# Patient Record
Sex: MALE | Race: WHITE | NOT HISPANIC OR LATINO
[De-identification: names, ages, dates, MRNs, and addresses within clinical notes are randomized per-mention and may not be internally consistent; named-entity substitution may affect disease eponyms.]

---

## 2018-10-04 ENCOUNTER — APPOINTMENT (OUTPATIENT)
Dept: SURGERY | Facility: CLINIC | Age: 68
End: 2018-10-04
Payer: MEDICARE

## 2018-10-04 ENCOUNTER — LABORATORY RESULT (OUTPATIENT)
Age: 68
End: 2018-10-04

## 2018-10-04 VITALS
HEART RATE: 61 BPM | WEIGHT: 215 LBS | SYSTOLIC BLOOD PRESSURE: 174 MMHG | HEIGHT: 69 IN | BODY MASS INDEX: 31.84 KG/M2 | DIASTOLIC BLOOD PRESSURE: 82 MMHG

## 2018-10-04 DIAGNOSIS — K11.9 DISEASE OF SALIVARY GLAND, UNSPECIFIED: ICD-10-CM

## 2018-10-04 PROCEDURE — 10021 FNA BX W/O IMG GDN 1ST LES: CPT

## 2018-10-04 PROCEDURE — 99203 OFFICE O/P NEW LOW 30 MIN: CPT

## 2018-10-04 RX ORDER — TAMSULOSIN HYDROCHLORIDE 0.4 MG/1
0.4 CAPSULE ORAL
Qty: 90 | Refills: 0 | Status: ACTIVE | COMMUNITY
Start: 2018-07-24

## 2018-10-04 RX ORDER — CLARITHROMYCIN 500 MG/1
500 TABLET, FILM COATED ORAL
Qty: 14 | Refills: 0 | Status: COMPLETED | COMMUNITY
Start: 2018-08-20

## 2018-10-04 RX ORDER — COLESEVELAM HYDROCHLORIDE 625 MG/1
625 TABLET, COATED ORAL
Qty: 60 | Refills: 0 | Status: COMPLETED | COMMUNITY
Start: 2018-08-13

## 2018-10-04 RX ORDER — VALSARTAN AND HYDROCHLOROTHIAZIDE 160; 25 MG/1; MG/1
160-25 TABLET, FILM COATED ORAL
Qty: 90 | Refills: 0 | Status: ACTIVE | COMMUNITY
Start: 2018-07-06

## 2018-10-10 ENCOUNTER — OTHER (OUTPATIENT)
Age: 68
End: 2018-10-10

## 2022-03-21 ENCOUNTER — APPOINTMENT (OUTPATIENT)
Dept: UROLOGY | Facility: CLINIC | Age: 72
End: 2022-03-21

## 2022-05-17 ENCOUNTER — APPOINTMENT (OUTPATIENT)
Dept: COLORECTAL SURGERY | Facility: CLINIC | Age: 72
End: 2022-05-17
Payer: MEDICARE

## 2022-05-17 VITALS
HEART RATE: 60 BPM | RESPIRATION RATE: 14 BRPM | HEIGHT: 70 IN | SYSTOLIC BLOOD PRESSURE: 154 MMHG | WEIGHT: 190 LBS | OXYGEN SATURATION: 99 % | BODY MASS INDEX: 27.2 KG/M2 | TEMPERATURE: 97.52 F | DIASTOLIC BLOOD PRESSURE: 71 MMHG

## 2022-05-17 DIAGNOSIS — Z87.891 PERSONAL HISTORY OF NICOTINE DEPENDENCE: ICD-10-CM

## 2022-05-17 DIAGNOSIS — K64.9 UNSPECIFIED HEMORRHOIDS: ICD-10-CM

## 2022-05-17 DIAGNOSIS — Z78.9 OTHER SPECIFIED HEALTH STATUS: ICD-10-CM

## 2022-05-17 DIAGNOSIS — K60.3 ANAL FISTULA: ICD-10-CM

## 2022-05-17 DIAGNOSIS — Z00.00 ENCOUNTER FOR GENERAL ADULT MEDICAL EXAMINATION W/OUT ABNORMAL FINDINGS: ICD-10-CM

## 2022-05-17 DIAGNOSIS — K62.5 HEMORRHAGE OF ANUS AND RECTUM: ICD-10-CM

## 2022-05-17 PROCEDURE — 99203 OFFICE O/P NEW LOW 30 MIN: CPT | Mod: 25

## 2022-05-17 PROCEDURE — 46600 DIAGNOSTIC ANOSCOPY SPX: CPT

## 2022-05-17 RX ORDER — OMEPRAZOLE 40 MG/1
40 CAPSULE, DELAYED RELEASE ORAL
Refills: 0 | Status: ACTIVE | COMMUNITY

## 2022-05-17 RX ORDER — ROSUVASTATIN CALCIUM 10 MG/1
10 TABLET, FILM COATED ORAL
Refills: 0 | Status: ACTIVE | COMMUNITY

## 2022-05-17 RX ORDER — CHOLECALCIFEROL (VITAMIN D3) 25 MCG
TABLET ORAL
Refills: 0 | Status: ACTIVE | COMMUNITY

## 2022-05-17 RX ORDER — APIXABAN 5 MG/1
5 TABLET, FILM COATED ORAL
Refills: 0 | Status: ACTIVE | COMMUNITY

## 2022-05-17 RX ORDER — METHYLPREDNISOLONE 4 MG/1
4 TABLET ORAL
Qty: 21 | Refills: 0 | Status: DISCONTINUED | COMMUNITY
Start: 2018-08-21 | End: 2022-05-17

## 2022-05-17 RX ORDER — FENOFIBRATE 145 MG/1
145 TABLET, COATED ORAL
Qty: 30 | Refills: 0 | Status: DISCONTINUED | COMMUNITY
Start: 2018-09-27 | End: 2022-05-17

## 2022-05-17 RX ORDER — MULTIVIT-MIN/FOLIC/VIT K/LYCOP 400-300MCG
TABLET ORAL
Refills: 0 | Status: ACTIVE | COMMUNITY

## 2022-05-17 RX ORDER — PANTOPRAZOLE 40 MG/1
40 TABLET, DELAYED RELEASE ORAL
Qty: 90 | Refills: 0 | Status: DISCONTINUED | COMMUNITY
Start: 2018-07-24 | End: 2022-05-17

## 2022-05-17 NOTE — ASSESSMENT
[FreeTextEntry1] : Likely hemorrhoidal bleeding\par -Patient with intermittent blood per rectum worsened with anticoagulation. Recent colonoscopy was otherwise normal\par -Hemorrhoids appear to be moderate in appearance but some irritation is appreciated\par -I recommended initial conservative therapy given interventions are limited with anti-coagulation and the patient's hemorrhoids are moderate grade 1/2 in appearance\par -Patient to initiate steroid cream internally 2 times per day for 2 weeks. He will then use as needed\par -Fiber supplementation daily\par -Followup in 4 weeks if no improvement we could consider operative ligation at that time\par -All questions answered

## 2022-05-17 NOTE — HISTORY OF PRESENT ILLNESS
[FreeTextEntry1] : 72yo M pt presents with rectal bleeding when pt has BM for about 6 months, occasional straining BM every other time he goes. \par Says now that pt is on eliquis 5mg for Afib starting May 1, so is concerned about more pronounced bleeding.\par Pt has BM daily, hard stools.Occasional straining\par Denies anal pain, abd pain, nausea, vomiting, prolapsing tissue.\par \par Pt was here seen by Dr. Migdalia Alston in [id number] for fistulotomy and RBL for IH.\par Last colonoscopy 2021, nl study. , No family history of colon cancer or inflammatory bowel disease

## 2022-05-17 NOTE — PHYSICAL EXAM
[Normal Breath Sounds] : Normal breath sounds [Normal Heart Sounds] : normal heart sounds [Normal Rate and Rhythm] : normal rate and rhythm [Alert] : alert [Oriented to Person] : oriented to person [Oriented to Place] : oriented to place [Oriented to Time] : oriented to time [Calm] : calm [de-identified] : round soft +BS NT/ND [de-identified] : well nourished male [de-identified] : NC/AT [de-identified] : +ROM [de-identified] : intact

## 2022-05-17 NOTE — CONSULT LETTER
[Dear  ___] : Dear  [unfilled], [Consult Letter:] : I had the pleasure of evaluating your patient, [unfilled]. [Please see my note below.] : Please see my note below. [Consult Closing:] : Thank you very much for allowing me to participate in the care of this patient.  If you have any questions, please do not hesitate to contact me. [Sincerely,] : Sincerely, [FreeTextEntry2] : Jamarcus Lindsay [FreeTextEntry3] : John Gabriel MD FACS\par Chief Colon and Rectal Surgery\par [facility]

## 2022-06-03 ENCOUNTER — INPATIENT (INPATIENT)
Facility: HOSPITAL | Age: 72
LOS: 3 days | Discharge: HOME | DRG: 438 | End: 2022-06-07
Attending: HOSPITALIST | Admitting: STUDENT IN AN ORGANIZED HEALTH CARE EDUCATION/TRAINING PROGRAM
Payer: MEDICARE

## 2022-06-03 VITALS
SYSTOLIC BLOOD PRESSURE: 133 MMHG | HEIGHT: 70 IN | DIASTOLIC BLOOD PRESSURE: 66 MMHG | OXYGEN SATURATION: 99 % | WEIGHT: 184.97 LBS | HEART RATE: 73 BPM | RESPIRATION RATE: 18 BRPM | TEMPERATURE: 98 F

## 2022-06-03 DIAGNOSIS — Z98.89 OTHER SPECIFIED POSTPROCEDURAL STATES: Chronic | ICD-10-CM

## 2022-06-03 DIAGNOSIS — K80.20 CALCULUS OF GALLBLADDER WITHOUT CHOLECYSTITIS WITHOUT OBSTRUCTION: Chronic | ICD-10-CM

## 2022-06-03 PROCEDURE — 99285 EMERGENCY DEPT VISIT HI MDM: CPT

## 2022-06-04 DIAGNOSIS — K21.9 GASTRO-ESOPHAGEAL REFLUX DISEASE WITHOUT ESOPHAGITIS: ICD-10-CM

## 2022-06-04 DIAGNOSIS — I10 ESSENTIAL (PRIMARY) HYPERTENSION: ICD-10-CM

## 2022-06-04 DIAGNOSIS — Z90.49 ACQUIRED ABSENCE OF OTHER SPECIFIED PARTS OF DIGESTIVE TRACT: Chronic | ICD-10-CM

## 2022-06-04 DIAGNOSIS — I48.20 CHRONIC ATRIAL FIBRILLATION, UNSPECIFIED: ICD-10-CM

## 2022-06-04 DIAGNOSIS — K22.70 BARRETT'S ESOPHAGUS WITHOUT DYSPLASIA: ICD-10-CM

## 2022-06-04 DIAGNOSIS — Z00.00 ENCOUNTER FOR GENERAL ADULT MEDICAL EXAMINATION WITHOUT ABNORMAL FINDINGS: ICD-10-CM

## 2022-06-04 DIAGNOSIS — K85.90 ACUTE PANCREATITIS WITHOUT NECROSIS OR INFECTION, UNSPECIFIED: ICD-10-CM

## 2022-06-04 DIAGNOSIS — Z87.448 PERSONAL HISTORY OF OTHER DISEASES OF URINARY SYSTEM: ICD-10-CM

## 2022-06-04 DIAGNOSIS — K86.89 OTHER SPECIFIED DISEASES OF PANCREAS: ICD-10-CM

## 2022-06-04 DIAGNOSIS — I48.0 PAROXYSMAL ATRIAL FIBRILLATION: ICD-10-CM

## 2022-06-04 DIAGNOSIS — E87.1 HYPO-OSMOLALITY AND HYPONATREMIA: ICD-10-CM

## 2022-06-04 LAB
ALBUMIN SERPL ELPH-MCNC: 3.9 G/DL — SIGNIFICANT CHANGE UP (ref 3.3–5)
ALP SERPL-CCNC: 104 U/L — SIGNIFICANT CHANGE UP (ref 40–120)
ALT FLD-CCNC: 10 U/L — SIGNIFICANT CHANGE UP (ref 10–45)
ANION GAP SERPL CALC-SCNC: 17 MMOL/L — SIGNIFICANT CHANGE UP (ref 5–17)
APPEARANCE UR: CLEAR — SIGNIFICANT CHANGE UP
APTT BLD: 40.7 SEC — HIGH (ref 27.5–35.5)
AST SERPL-CCNC: 13 U/L — SIGNIFICANT CHANGE UP (ref 10–40)
BACTERIA # UR AUTO: NEGATIVE — SIGNIFICANT CHANGE UP
BASE EXCESS BLDV CALC-SCNC: 2.7 MMOL/L — HIGH (ref -2–2)
BASOPHILS # BLD AUTO: 0 K/UL — SIGNIFICANT CHANGE UP (ref 0–0.2)
BASOPHILS NFR BLD AUTO: 0 % — SIGNIFICANT CHANGE UP (ref 0–2)
BILIRUB SERPL-MCNC: 0.9 MG/DL — SIGNIFICANT CHANGE UP (ref 0.2–1.2)
BILIRUB UR-MCNC: NEGATIVE — SIGNIFICANT CHANGE UP
BLD GP AB SCN SERPL QL: NEGATIVE — SIGNIFICANT CHANGE UP
BLOOD GAS VENOUS - CREATININE: SIGNIFICANT CHANGE UP MG/DL (ref 0.5–1.3)
BUN SERPL-MCNC: 16 MG/DL — SIGNIFICANT CHANGE UP (ref 7–23)
BUN SERPL-MCNC: 20 MG/DL — SIGNIFICANT CHANGE UP (ref 7–23)
BUN SERPL-MCNC: 22 MG/DL — SIGNIFICANT CHANGE UP (ref 7–23)
CA-I SERPL-SCNC: 1.23 MMOL/L — SIGNIFICANT CHANGE UP (ref 1.15–1.33)
CALCIUM SERPL-MCNC: 10 MG/DL — SIGNIFICANT CHANGE UP (ref 8.4–10.5)
CALCIUM SERPL-MCNC: 10 MG/DL — SIGNIFICANT CHANGE UP (ref 8.4–10.5)
CALCIUM SERPL-MCNC: 9.7 MG/DL — SIGNIFICANT CHANGE UP (ref 8.4–10.5)
CHLORIDE BLDV-SCNC: 88 MMOL/L — LOW (ref 96–108)
CHLORIDE SERPL-SCNC: 79 MMOL/L — LOW (ref 96–108)
CHLORIDE SERPL-SCNC: 80 MMOL/L — LOW (ref 96–108)
CHLORIDE SERPL-SCNC: 85 MMOL/L — LOW (ref 96–108)
CO2 BLDV-SCNC: 27 MMOL/L — HIGH (ref 22–26)
CO2 SERPL-SCNC: 22 MMOL/L — SIGNIFICANT CHANGE UP (ref 22–31)
CO2 SERPL-SCNC: 24 MMOL/L — SIGNIFICANT CHANGE UP (ref 22–31)
CO2 SERPL-SCNC: 24 MMOL/L — SIGNIFICANT CHANGE UP (ref 22–31)
COLOR SPEC: SIGNIFICANT CHANGE UP
CREAT ?TM UR-MCNC: 58 MG/DL — SIGNIFICANT CHANGE UP
CREAT SERPL-MCNC: 1.01 MG/DL — SIGNIFICANT CHANGE UP (ref 0.5–1.3)
CREAT SERPL-MCNC: 1.19 MG/DL — SIGNIFICANT CHANGE UP (ref 0.5–1.3)
CREAT SERPL-MCNC: 1.26 MG/DL — SIGNIFICANT CHANGE UP (ref 0.5–1.3)
DIFF PNL FLD: ABNORMAL
EGFR: 61 ML/MIN/1.73M2 — SIGNIFICANT CHANGE UP
EGFR: 65 ML/MIN/1.73M2 — SIGNIFICANT CHANGE UP
EGFR: 79 ML/MIN/1.73M2 — SIGNIFICANT CHANGE UP
EOSINOPHIL # BLD AUTO: 0 K/UL — SIGNIFICANT CHANGE UP (ref 0–0.5)
EOSINOPHIL NFR BLD AUTO: 0 % — SIGNIFICANT CHANGE UP (ref 0–6)
EPI CELLS # UR: 0 /HPF — SIGNIFICANT CHANGE UP
GAS PNL BLDV: 119 MMOL/L — CRITICAL LOW (ref 136–145)
GAS PNL BLDV: SIGNIFICANT CHANGE UP
GAS PNL BLDV: SIGNIFICANT CHANGE UP
GLUCOSE BLDV-MCNC: 99 MG/DL — SIGNIFICANT CHANGE UP (ref 70–99)
GLUCOSE SERPL-MCNC: 100 MG/DL — HIGH (ref 70–99)
GLUCOSE SERPL-MCNC: 102 MG/DL — HIGH (ref 70–99)
GLUCOSE SERPL-MCNC: 93 MG/DL — SIGNIFICANT CHANGE UP (ref 70–99)
GLUCOSE UR QL: NEGATIVE — SIGNIFICANT CHANGE UP
HCO3 BLDV-SCNC: 26 MMOL/L — SIGNIFICANT CHANGE UP (ref 22–29)
HCT VFR BLD CALC: 30.3 % — LOW (ref 39–50)
HCT VFR BLDA CALC: 32 % — LOW (ref 39–51)
HGB BLD CALC-MCNC: 10.8 G/DL — LOW (ref 12.6–17.4)
HGB BLD-MCNC: 10.7 G/DL — LOW (ref 13–17)
INR BLD: 2.71 RATIO — HIGH (ref 0.88–1.16)
KETONES UR-MCNC: ABNORMAL
LACTATE BLDV-MCNC: 0.9 MMOL/L — SIGNIFICANT CHANGE UP (ref 0.7–2)
LEUKOCYTE ESTERASE UR-ACNC: NEGATIVE — SIGNIFICANT CHANGE UP
LIDOCAIN IGE QN: 215 U/L — HIGH (ref 7–60)
LYMPHOCYTES # BLD AUTO: 0.3 K/UL — LOW (ref 1–3.3)
LYMPHOCYTES # BLD AUTO: 1.7 % — LOW (ref 13–44)
MAGNESIUM SERPL-MCNC: 1.1 MG/DL — LOW (ref 1.6–2.6)
MANUAL SMEAR VERIFICATION: SIGNIFICANT CHANGE UP
MCHC RBC-ENTMCNC: 28.5 PG — SIGNIFICANT CHANGE UP (ref 27–34)
MCHC RBC-ENTMCNC: 35.3 GM/DL — SIGNIFICANT CHANGE UP (ref 32–36)
MCV RBC AUTO: 80.6 FL — SIGNIFICANT CHANGE UP (ref 80–100)
MONOCYTES # BLD AUTO: 0.46 K/UL — SIGNIFICANT CHANGE UP (ref 0–0.9)
MONOCYTES NFR BLD AUTO: 2.6 % — SIGNIFICANT CHANGE UP (ref 2–14)
NEUTROPHILS # BLD AUTO: 17.01 K/UL — HIGH (ref 1.8–7.4)
NEUTROPHILS NFR BLD AUTO: 94.8 % — HIGH (ref 43–77)
NEUTS BAND # BLD: 0.9 % — SIGNIFICANT CHANGE UP (ref 0–8)
NITRITE UR-MCNC: NEGATIVE — SIGNIFICANT CHANGE UP
OSMOLALITY UR: 275 MOS/KG — LOW (ref 300–900)
OTHER CELLS CSF MANUAL: 12.2 ML/DL — LOW (ref 18–22)
PCO2 BLDV: 35 MMHG — LOW (ref 42–55)
PH BLDV: 7.48 — HIGH (ref 7.32–7.43)
PH UR: 6 — SIGNIFICANT CHANGE UP (ref 5–8)
PHOSPHATE SERPL-MCNC: 1.9 MG/DL — LOW (ref 2.5–4.5)
PLAT MORPH BLD: NORMAL — SIGNIFICANT CHANGE UP
PLATELET # BLD AUTO: 333 K/UL — SIGNIFICANT CHANGE UP (ref 150–400)
PO2 BLDV: 48 MMHG — HIGH (ref 25–45)
POTASSIUM BLDV-SCNC: 3.8 MMOL/L — SIGNIFICANT CHANGE UP (ref 3.5–5.1)
POTASSIUM SERPL-MCNC: 3.1 MMOL/L — LOW (ref 3.5–5.3)
POTASSIUM SERPL-MCNC: 3.4 MMOL/L — LOW (ref 3.5–5.3)
POTASSIUM SERPL-MCNC: 3.8 MMOL/L — SIGNIFICANT CHANGE UP (ref 3.5–5.3)
POTASSIUM SERPL-SCNC: 3.1 MMOL/L — LOW (ref 3.5–5.3)
POTASSIUM SERPL-SCNC: 3.4 MMOL/L — LOW (ref 3.5–5.3)
POTASSIUM SERPL-SCNC: 3.8 MMOL/L — SIGNIFICANT CHANGE UP (ref 3.5–5.3)
PROT ?TM UR-MCNC: 23 MG/DL — HIGH (ref 0–12)
PROT SERPL-MCNC: 7.2 G/DL — SIGNIFICANT CHANGE UP (ref 6–8.3)
PROT UR-MCNC: ABNORMAL
PROTHROM AB SERPL-ACNC: 31.7 SEC — HIGH (ref 10.5–13.4)
RAPID RVP RESULT: SIGNIFICANT CHANGE UP
RBC # BLD: 3.76 M/UL — LOW (ref 4.2–5.8)
RBC # FLD: 12.9 % — SIGNIFICANT CHANGE UP (ref 10.3–14.5)
RBC BLD AUTO: SIGNIFICANT CHANGE UP
RBC CASTS # UR COMP ASSIST: 1 /HPF — SIGNIFICANT CHANGE UP (ref 0–4)
RH IG SCN BLD-IMP: NEGATIVE — SIGNIFICANT CHANGE UP
RH IG SCN BLD-IMP: NEGATIVE — SIGNIFICANT CHANGE UP
SAO2 % BLDV: 82.7 % — SIGNIFICANT CHANGE UP (ref 67–88)
SARS-COV-2 RNA SPEC QL NAA+PROBE: SIGNIFICANT CHANGE UP
SODIUM SERPL-SCNC: 120 MMOL/L — CRITICAL LOW (ref 135–145)
SODIUM SERPL-SCNC: 121 MMOL/L — LOW (ref 135–145)
SODIUM SERPL-SCNC: 124 MMOL/L — LOW (ref 135–145)
SODIUM UR-SCNC: 16 MMOL/L — SIGNIFICANT CHANGE UP
SP GR SPEC: 1.03 — HIGH (ref 1.01–1.02)
UROBILINOGEN FLD QL: NEGATIVE — SIGNIFICANT CHANGE UP
WBC # BLD: 17.77 K/UL — HIGH (ref 3.8–10.5)
WBC # FLD AUTO: 17.77 K/UL — HIGH (ref 3.8–10.5)
WBC UR QL: 1 /HPF — SIGNIFICANT CHANGE UP (ref 0–5)

## 2022-06-04 PROCEDURE — 99223 1ST HOSP IP/OBS HIGH 75: CPT | Mod: GC,AI

## 2022-06-04 PROCEDURE — 71045 X-RAY EXAM CHEST 1 VIEW: CPT | Mod: 26

## 2022-06-04 PROCEDURE — 74177 CT ABD & PELVIS W/CONTRAST: CPT | Mod: 26,MA

## 2022-06-04 PROCEDURE — 99222 1ST HOSP IP/OBS MODERATE 55: CPT

## 2022-06-04 RX ORDER — SODIUM CHLORIDE 9 MG/ML
1000 INJECTION, SOLUTION INTRAVENOUS
Refills: 0 | Status: DISCONTINUED | OUTPATIENT
Start: 2022-06-04 | End: 2022-06-05

## 2022-06-04 RX ORDER — PANTOPRAZOLE SODIUM 20 MG/1
40 TABLET, DELAYED RELEASE ORAL EVERY 12 HOURS
Refills: 0 | Status: DISCONTINUED | OUTPATIENT
Start: 2022-06-04 | End: 2022-06-05

## 2022-06-04 RX ORDER — ALLOPURINOL 300 MG
200 TABLET ORAL DAILY
Refills: 0 | Status: DISCONTINUED | OUTPATIENT
Start: 2022-06-04 | End: 2022-06-04

## 2022-06-04 RX ORDER — BISOPROLOL FUMARATE 10 MG/1
10 TABLET, FILM COATED ORAL DAILY
Refills: 0 | Status: DISCONTINUED | OUTPATIENT
Start: 2022-06-04 | End: 2022-06-07

## 2022-06-04 RX ORDER — MAGNESIUM SULFATE 500 MG/ML
2 VIAL (ML) INJECTION ONCE
Refills: 0 | Status: COMPLETED | OUTPATIENT
Start: 2022-06-04 | End: 2022-06-04

## 2022-06-04 RX ORDER — POTASSIUM PHOSPHATE, MONOBASIC POTASSIUM PHOSPHATE, DIBASIC 236; 224 MG/ML; MG/ML
30 INJECTION, SOLUTION INTRAVENOUS ONCE
Refills: 0 | Status: COMPLETED | OUTPATIENT
Start: 2022-06-04 | End: 2022-06-04

## 2022-06-04 RX ORDER — ACETAMINOPHEN 500 MG
650 TABLET ORAL EVERY 6 HOURS
Refills: 0 | Status: DISCONTINUED | OUTPATIENT
Start: 2022-06-04 | End: 2022-06-07

## 2022-06-04 RX ORDER — ALLOPURINOL 300 MG
200 TABLET ORAL
Qty: 0 | Refills: 0 | DISCHARGE

## 2022-06-04 RX ORDER — ROSUVASTATIN CALCIUM 5 MG/1
1 TABLET ORAL
Qty: 0 | Refills: 0 | DISCHARGE

## 2022-06-04 RX ORDER — POTASSIUM CHLORIDE 20 MEQ
40 PACKET (EA) ORAL ONCE
Refills: 0 | Status: COMPLETED | OUTPATIENT
Start: 2022-06-04 | End: 2022-06-04

## 2022-06-04 RX ORDER — PANTOPRAZOLE SODIUM 20 MG/1
80 TABLET, DELAYED RELEASE ORAL ONCE
Refills: 0 | Status: COMPLETED | OUTPATIENT
Start: 2022-06-04 | End: 2022-06-04

## 2022-06-04 RX ORDER — ONDANSETRON 8 MG/1
4 TABLET, FILM COATED ORAL EVERY 8 HOURS
Refills: 0 | Status: DISCONTINUED | OUTPATIENT
Start: 2022-06-04 | End: 2022-06-07

## 2022-06-04 RX ORDER — SODIUM CHLORIDE 9 MG/ML
500 INJECTION, SOLUTION INTRAVENOUS ONCE
Refills: 0 | Status: COMPLETED | OUTPATIENT
Start: 2022-06-04 | End: 2022-06-04

## 2022-06-04 RX ORDER — MEROPENEM 1 G/30ML
1000 INJECTION INTRAVENOUS EVERY 8 HOURS
Refills: 0 | Status: DISCONTINUED | OUTPATIENT
Start: 2022-06-04 | End: 2022-06-06

## 2022-06-04 RX ORDER — LANOLIN ALCOHOL/MO/W.PET/CERES
3 CREAM (GRAM) TOPICAL AT BEDTIME
Refills: 0 | Status: DISCONTINUED | OUTPATIENT
Start: 2022-06-04 | End: 2022-06-07

## 2022-06-04 RX ORDER — ALLOPURINOL 300 MG
200 TABLET ORAL DAILY
Refills: 0 | Status: DISCONTINUED | OUTPATIENT
Start: 2022-06-04 | End: 2022-06-07

## 2022-06-04 RX ORDER — POTASSIUM CHLORIDE 20 MEQ
10 PACKET (EA) ORAL
Refills: 0 | Status: COMPLETED | OUTPATIENT
Start: 2022-06-04 | End: 2022-06-04

## 2022-06-04 RX ORDER — HYDROCORTISONE 1 %
1 OINTMENT (GRAM) TOPICAL THREE TIMES A DAY
Refills: 0 | Status: DISCONTINUED | OUTPATIENT
Start: 2022-06-04 | End: 2022-06-07

## 2022-06-04 RX ORDER — ROSUVASTATIN CALCIUM 5 MG/1
10 TABLET ORAL AT BEDTIME
Refills: 0 | Status: DISCONTINUED | OUTPATIENT
Start: 2022-06-04 | End: 2022-06-07

## 2022-06-04 RX ORDER — TAMSULOSIN HYDROCHLORIDE 0.4 MG/1
0.8 CAPSULE ORAL
Qty: 0 | Refills: 0 | DISCHARGE

## 2022-06-04 RX ORDER — TAMSULOSIN HYDROCHLORIDE 0.4 MG/1
0.8 CAPSULE ORAL AT BEDTIME
Refills: 0 | Status: DISCONTINUED | OUTPATIENT
Start: 2022-06-04 | End: 2022-06-07

## 2022-06-04 RX ORDER — HYDROCORTISONE 1 %
1 OINTMENT (GRAM) TOPICAL
Qty: 0 | Refills: 0 | DISCHARGE

## 2022-06-04 RX ORDER — CALCIUM CARBONATE 500(1250)
2 TABLET ORAL
Qty: 0 | Refills: 0 | DISCHARGE

## 2022-06-04 RX ORDER — TAMSULOSIN HYDROCHLORIDE 0.4 MG/1
1 CAPSULE ORAL
Qty: 0 | Refills: 0 | DISCHARGE

## 2022-06-04 RX ADMIN — ROSUVASTATIN CALCIUM 10 MILLIGRAM(S): 5 TABLET ORAL at 22:37

## 2022-06-04 RX ADMIN — Medication 100 MILLIEQUIVALENT(S): at 10:08

## 2022-06-04 RX ADMIN — Medication 25 GRAM(S): at 20:45

## 2022-06-04 RX ADMIN — Medication 100 MILLIEQUIVALENT(S): at 08:44

## 2022-06-04 RX ADMIN — BISOPROLOL FUMARATE 10 MILLIGRAM(S): 10 TABLET, FILM COATED ORAL at 15:53

## 2022-06-04 RX ADMIN — Medication 40 MILLIEQUIVALENT(S): at 08:53

## 2022-06-04 RX ADMIN — POTASSIUM PHOSPHATE, MONOBASIC POTASSIUM PHOSPHATE, DIBASIC 83.33 MILLIMOLE(S): 236; 224 INJECTION, SOLUTION INTRAVENOUS at 22:36

## 2022-06-04 RX ADMIN — PANTOPRAZOLE SODIUM 80 MILLIGRAM(S): 20 TABLET, DELAYED RELEASE ORAL at 05:19

## 2022-06-04 RX ADMIN — SODIUM CHLORIDE 500 MILLILITER(S): 9 INJECTION, SOLUTION INTRAVENOUS at 05:25

## 2022-06-04 RX ADMIN — TAMSULOSIN HYDROCHLORIDE 0.8 MILLIGRAM(S): 0.4 CAPSULE ORAL at 22:36

## 2022-06-04 RX ADMIN — SODIUM CHLORIDE 100 MILLILITER(S): 9 INJECTION, SOLUTION INTRAVENOUS at 11:57

## 2022-06-04 RX ADMIN — MEROPENEM 100 MILLIGRAM(S): 1 INJECTION INTRAVENOUS at 16:30

## 2022-06-04 RX ADMIN — SODIUM CHLORIDE 500 MILLILITER(S): 9 INJECTION, SOLUTION INTRAVENOUS at 08:34

## 2022-06-04 RX ADMIN — Medication 100 MILLIEQUIVALENT(S): at 07:33

## 2022-06-04 RX ADMIN — PANTOPRAZOLE SODIUM 40 MILLIGRAM(S): 20 TABLET, DELAYED RELEASE ORAL at 19:01

## 2022-06-04 RX ADMIN — MEROPENEM 100 MILLIGRAM(S): 1 INJECTION INTRAVENOUS at 22:36

## 2022-06-04 NOTE — H&P ADULT - PROBLEM SELECTOR PLAN 6
DVT Prophylaxis: plan to restart home Eliquis 5mg daily  Diet: NPO, pending GI reccs  Increase activity as tolerated

## 2022-06-04 NOTE — ED ADULT NURSE NOTE - NSIMPLEMENTINTERV_GEN_ALL_ED
Implemented All Fall with Harm Risk Interventions:  [place] to call system. Call bell, personal items and telephone within reach. Instruct patient to call for assistance. Room bathroom lighting operational. Non-slip footwear when patient is off stretcher. Physically safe environment: no spills, clutter or unnecessary equipment. Stretcher in lowest position, wheels locked, appropriate side rails in place. Provide visual cue, wrist band, yellow gown, etc. Monitor gait and stability. Monitor for mental status changes and reorient to person, place, and time. Review medications for side effects contributing to fall risk. Reinforce activity limits and safety measures with patient and family. Provide visual clues: red socks.

## 2022-06-04 NOTE — H&P ADULT - NSICDXPASTSURGICALHX_GEN_ALL_CORE_FT
PAST SURGICAL HISTORY:  Gallbladder calculus     History of cholecystectomy     S/P herniorrhaphy     S/P tonsillectomy

## 2022-06-04 NOTE — H&P ADULT - PROBLEM SELECTOR PLAN 4
- home regimen: Bisoprolol 10mg/HCTZ 6.25mg home regimen: Bisoprolol 10mg/HCTZ 6.25mg  - hold HCTZ i/s/o hyponatremia  - c/w Bisoprolol 10mg daily

## 2022-06-04 NOTE — H&P ADULT - HISTORY OF PRESENT ILLNESS
70 y/o M w/ pmh of GERD, Marcial's Esophagus (Last EGD in 4/2022 w/ no acute changes), Gout, Afibb (on Eliquis), HTN who presents w/ abd pain. Sharp, up to 8/10 pains initially diffuse, now localized upper Lt quadrant/ mid- epigastrum, worsened after eating for 4 days. Associated w/ dark stools, watery  70 y/o M w/ pmh of GERD, Marcial's Esophagus (Last EGD in 4/2022 w/ no acute changes), Gout, Afibb (on Eliquis), HTN who presents w/ abd pain. Sharp, up to 8/10 pains initially diffuse, now localized upper Lt quadrant/ mid- epigastrum, worsened after eating for 4 days. 4 days ago patient noticed sharp abdominal pain within hour or two of dinner, felt like he had not had a bowel movement in a few days, and took two 5mg tablets Bisacoydl. 1hr later found with dark brown watery stools 3x, then later stools, more solid appearing though with bright red blood, noticed in toilet bowl. OF note, patient has hemorrhoids and during a flare will notice bright red blood on tissue paper only. PT had repeated episodic abdominal pain, sharp in lUQ/ mid epigastrium after meals. On 6/3 he had had chills, subjective fever of 101.2, took Tylenol and has been taking Tums for pain, went to urgent care, and was advised to come into the ED. With Esophageal history patient's last Colonoscopy was in 2021 with no known abnormal findings and last EGD in 4/2021 after which he was told to come back for repeat imaging in 6mos. Patient is a former smoker of 40 pack yrs, quit 20years ago, denies recent alcohol use or other recreational drug use. He has noticed some recent symptoms of heart burn, though pain was associated with his GERD, which usually doesn't flare, and was told to increase omeprazole to 20mg BID, which he began the new bid dosing on 6/3. PT denies nausea, vomiting, fevers/ chills at present. Ambulates independently at home.     ED:   Afebrile HD stable  Na 120--> 121 after 500cc LR bolus

## 2022-06-04 NOTE — H&P ADULT - NSHPREVIEWOFSYSTEMS_GEN_ALL_CORE
REVIEW OF SYSTEMS:    CONSTITUTIONAL: No weakness, fevers or chills  EYES/ENT: No visual changes;  No vertigo or throat pain   NECK: No pain or stiffness  RESPIRATORY: No cough, wheezing, hemoptysis; No shortness of breath  CARDIOVASCULAR: No chest pain or palpitations  GASTROINTESTINAL: + abdominal and epigastric pain, currently 1/10. No nausea, vomiting, or hematemesis; No diarrhea or constipation. No melena or hematochezia.  GENITOURINARY: No dysuria, frequency or hematuria  NEUROLOGICAL: No numbness or weakness  SKIN: No itching, rashes

## 2022-06-04 NOTE — ED PROVIDER NOTE - PHYSICAL EXAMINATION
General: well appearing   HEENT: neck supple, anicteric sclera  Cardiovascular: Normal s1, s2, RRR  Respiratory: CTA b/l   Abdominal: Soft, LLQ tenderness  Extremities: No swelling in LEs  Neurologic: Non focal  Psych: Awake, alert answering questions appropriately

## 2022-06-04 NOTE — H&P ADULT - NSHPPHYSICALEXAM_GEN_ALL_CORE
PHYSICAL EXAM:  GENERAL: overall well appearing, appearing stated age 70y/o Male in NAD, lying in bed comfortably  HEENT: +Rt eye severe lateral strabismus, NC/AT, EOMI, PERRL  NECK: Supple, No JVD  CHEST/LUNG: CTAB, no increased WOB  HEART: RRR, no m/r/g  ABDOMEN: soft, NT, ND, BS+  EXTREMITIES:  2+ peripheral pulses, no clubbing, no edema  NERVOUS SYSTEM:  A&Ox3, no focal deficits  MSK: FROM all 4 extremities, full and equal strength  SKIN: No rashes or lesions

## 2022-06-04 NOTE — H&P ADULT - NSHPLABSRESULTS_GEN_ALL_CORE
10.7   17.77 )-----------( 333      ( 2022 03:11 )             30.3       06-04    121<L>  |  80<L>  |  20  ----------------------------<  102<H>  3.1<L>   |  24  |  1.19    Ca    9.7      2022 06:32    TPro  7.2  /  Alb  3.9  /  TBili  0.9  /  DBili  x   /  AST  13  /  ALT  10  /  AlkPhos  104  06-04              Urinalysis Basic - ( 2022 05:40 )    Color: Light Yellow / Appearance: Clear / S.029 / pH: x  Gluc: x / Ketone: Small  / Bili: Negative / Urobili: Negative   Blood: x / Protein: 30 mg/dL / Nitrite: Negative   Leuk Esterase: Negative / RBC: 1 /hpf / WBC 1 /HPF   Sq Epi: x / Non Sq Epi: 0 /hpf / Bacteria: Negative        PT/INR - ( 2022 03:11 )   PT: 31.7 sec;   INR: 2.71 ratio         PTT - ( 2022 03:11 )  PTT:40.7 sec    Lactate Trend            CAPILLARY BLOOD GLUCOSE 10.7   17.77 )-----------( 333      ( 2022 03:11 )             30.3       06-04    121<L>  |  80<L>  |  20  ----------------------------<  102<H>  3.1<L>   |  24  |  1.19    Ca    9.7      2022 06:32    TPro  7.2  /  Alb  3.9  /  TBili  0.9  /  DBili  x   /  AST  13  /  ALT  10  /  AlkPhos  104  06-04              Urinalysis Basic - ( 2022 05:40 )    Color: Light Yellow / Appearance: Clear / S.029 / pH: x  Gluc: x / Ketone: Small  / Bili: Negative / Urobili: Negative   Blood: x / Protein: 30 mg/dL / Nitrite: Negative   Leuk Esterase: Negative / RBC: 1 /hpf / WBC 1 /HPF   Sq Epi: x / Non Sq Epi: 0 /hpf / Bacteria: Negative        PT/INR - ( 2022 03:11 )   PT: 31.7 sec;   INR: 2.71 ratio         PTT - ( 2022 03:11 )  PTT:40.7 sec    Lactate Trend    ACC: [id number] EXAM: CT ABDOMEN AND PELVIS IC    PROCEDURE DATE: 2022        INTERPRETATION: CLINICAL INFORMATION: Abdominal pain.    COMPARISON: None.    CONTRAST/COMPLICATIONS:  IV Contrast: Omnipaque 350 60 cc administered 0 cc discarded  Oral Contrast: NONE  Complications: None reported at time of study completion    PROCEDURE:  CT of the Abdomen and Pelvis was performed.  Sagittal and coronal reformats were performed.    FINDINGS:  LOWER CHEST: Within normal limits.    LIVER: Subcentimeter hypodensities too small to characterize  BILE DUCTS: Normal caliber.  GALLBLADDER: Cholecystectomy.  SPLEEN: Within normal limits.  PANCREAS: Edematous enlargement of the pancreatic body and tail with surrounding inflammatory changes and fluid. Several pancreatic and peripancreatic fluid collections. Representative collection in the pancreatic tail measures 4.5 x 3.2 x 3.8 cm, representative collection along the inferior aspect of the pancreatic body measures 4.0 x 4.1 x 2.9 cm and representative collection in the pancreatic body measures 2.0 x 1.6 x 1.4 cm. Pancreatic duct is dilated in the pancreatic neck and proximal body. Additional cystic lesions in the pancreatic head, the larger of which measures 2 cm.  ADRENALS: Within normal limits.  KIDNEYS/URETERS: Kidneys enhance symmetrically without hydronephrosis. Right lower pole indeterminate lesion measuring 1.9 x 2.0 x 2.1 cm (602-80). Several bilateral renal cysts and subcentimeter hypodensities too small to characterize.    BLADDER: Within normal limits.  REPRODUCTIVE ORGANS: Enlarged prostate gland with median hypertrophy.    BOWEL: No bowel obstruction. Appendix is normal. Reactive thickening of the colon at the splenic flexure.  PERITONEUM: Trace pelvic free fluid. No pneumoperitoneum. No mesenteric lymphadenopathy.  VESSELS: Atherosclerotic calcifications of the aortoiliac tree. Normal caliber abdominal aorta. Portal vein and splenic vein are patent.  RETROPERITONEUM/LYMPH NODES: No lymphadenopathy.  ABDOMINAL WALL: Small fat-containing umbilical and right inguinal hernia.  BONES: Degenerative change of the spine.    IMPRESSION:  Acute pancreatitis predominantly involving the pancreatic body and tail. Several pancreatic and peripancreatic fluid collections, as described above for which considerations include pseudocysts, walled off necrosis, or abscess. Pancreatic ductal dilatation in the pancreatic neck and proximal body. Additional cystic lesions in the pancreatic head, the larger of which measures 2 cm. Pancreas protocol MRI of the abdomen is recommended for better characterization. Underlying malignancy is not excluded.  Indeterminate cystic lesion in the right kidney measuring up to 2.1 cm which can also be characterized on MRI.    --- End of Report ---          SERGEI STEPHEN MD; Resident Radiologist  This document has been electronically signed.  KIAN ALONSO DO; Attending Radiologist  This document has been electronically signed. 2022 5:00AM 10.7   17.77 )-----------( 333      ( 2022 03:11 )             30.3       06-04    121<L>  |  80<L>  |  20  ----------------------------<  102<H>  3.1<L>   |  24  |  1.19    Ca    9.7      2022 06:32    TPro  7.2  /  Alb  3.9  /  TBili  0.9  /  DBili  x   /  AST  13  /  ALT  10  /  AlkPhos  104  06-04              Urinalysis Basic - ( 2022 05:40 )    Color: Light Yellow / Appearance: Clear / S.029 / pH: x  Gluc: x / Ketone: Small  / Bili: Negative / Urobili: Negative   Blood: x / Protein: 30 mg/dL / Nitrite: Negative   Leuk Esterase: Negative / RBC: 1 /hpf / WBC 1 /HPF   Sq Epi: x / Non Sq Epi: 0 /hpf / Bacteria: Negative        PT/INR - ( 2022 03:11 )   PT: 31.7 sec;   INR: 2.71 ratio         PTT - ( 2022 03:11 )  PTT:40.7 sec    Lactate Trend  CX:   FINDINGS:  The heart is normal in size.  The lungs are clear.  There is no pneumothorax or pleural effusion.    IMPRESSION:  Clear lungs.    --- End of Report ---    ACC: [id number] EXAM: CT ABDOMEN AND PELVIS IC    PROCEDURE DATE: 2022        INTERPRETATION: CLINICAL INFORMATION: Abdominal pain.    COMPARISON: None.    CONTRAST/COMPLICATIONS:  IV Contrast: Omnipaque 350 60 cc administered 0 cc discarded  Oral Contrast: NONE  Complications: None reported at time of study completion    PROCEDURE:  CT of the Abdomen and Pelvis was performed.  Sagittal and coronal reformats were performed.    FINDINGS:  LOWER CHEST: Within normal limits.    LIVER: Subcentimeter hypodensities too small to characterize  BILE DUCTS: Normal caliber.  GALLBLADDER: Cholecystectomy.  SPLEEN: Within normal limits.  PANCREAS: Edematous enlargement of the pancreatic body and tail with surrounding inflammatory changes and fluid. Several pancreatic and peripancreatic fluid collections. Representative collection in the pancreatic tail measures 4.5 x 3.2 x 3.8 cm, representative collection along the inferior aspect of the pancreatic body measures 4.0 x 4.1 x 2.9 cm and representative collection in the pancreatic body measures 2.0 x 1.6 x 1.4 cm. Pancreatic duct is dilated in the pancreatic neck and proximal body. Additional cystic lesions in the pancreatic head, the larger of which measures 2 cm.  ADRENALS: Within normal limits.  KIDNEYS/URETERS: Kidneys enhance symmetrically without hydronephrosis. Right lower pole indeterminate lesion measuring 1.9 x 2.0 x 2.1 cm (602-80). Several bilateral renal cysts and subcentimeter hypodensities too small to characterize.    BLADDER: Within normal limits.  REPRODUCTIVE ORGANS: Enlarged prostate gland with median hypertrophy.    BOWEL: No bowel obstruction. Appendix is normal. Reactive thickening of the colon at the splenic flexure.  PERITONEUM: Trace pelvic free fluid. No pneumoperitoneum. No mesenteric lymphadenopathy.  VESSELS: Atherosclerotic calcifications of the aortoiliac tree. Normal caliber abdominal aorta. Portal vein and splenic vein are patent.  RETROPERITONEUM/LYMPH NODES: No lymphadenopathy.  ABDOMINAL WALL: Small fat-containing umbilical and right inguinal hernia.  BONES: Degenerative change of the spine.    IMPRESSION:  Acute pancreatitis predominantly involving the pancreatic body and tail. Several pancreatic and peripancreatic fluid collections, as described above for which considerations include pseudocysts, walled off necrosis, or abscess. Pancreatic ductal dilatation in the pancreatic neck and proximal body. Additional cystic lesions in the pancreatic head, the larger of which measures 2 cm. Pancreas protocol MRI of the abdomen is recommended for better characterization. Underlying malignancy is not excluded.  Indeterminate cystic lesion in the right kidney measuring up to 2.1 cm which can also be characterized on MRI.    --- End of Report ---          SERGEI STEPHEN MD; Resident Radiologist  This document has been electronically signed.  KIAN ALONSO DO; Attending Radiologist  This document has been electronically signed. 2022 5:00AM

## 2022-06-04 NOTE — ED PROVIDER NOTE - OBJECTIVE STATEMENT
70 y/o M w/ mhx of gout, afib on eliquis, DM, HTN presents' w/ abd pain and ? dark stool x 3 days. Patient states he was constipated, developed abd pain + fever, took tums w/ no relief and then took laxatives. Had 4 Bms, states it was dark. Hx of hemorrhoids. LLQ pain persisted. No other complaints.

## 2022-06-04 NOTE — ED ADULT NURSE NOTE - OBJECTIVE STATEMENT
patient is a 72 y/o M with hx of afib on eliquis, HTN, HLD hemorrhoids, GERD, diverticulosis who presents to the ED c/o rectal bleeding. patient states that he has had 4 episodes of "dark" stool over the past days, with the first episode being "tarry" like, which he now describes with his last BM of having "red spots". patient states that he has history of bleeding hemorrhoids however the bleeding he is having now is not similar to his hemorrhoids. patient also endorsing abdominal cramping however has history of diverticulosis and states that this pain is similar however has slightly worsened over the past 4 days. patient denies n/v. patient seen at  and directed to ED for CT scan. patient is a&ox4, PERRL. respirations even and unlabored. abdomen soft and nondistended. skin warm, dry, and intact. denies CP, dizziness, SOB, h/a, diarrhea, urinary symptoms. pending MD salmeron. comfort and safety maintained

## 2022-06-04 NOTE — ED PROVIDER NOTE - CLINICAL SUMMARY MEDICAL DECISION MAKING FREE TEXT BOX
70 y/o M w/ mhx of gout, afib on eliquis, DM, HTN presents' w/ abd pain and ? dark stool x 3 days c/f divertic vs ugib vs intraabd path. PE remarkable for LLQ tenderness. Labs, CT abd. Dispo pending. 70 y/o M w/ mhx of gout, afib on eliquis, DM, HTN presents' w/ abd pain and ? dark stool x 3 days c/f divertic vs ugib vs intraabd path. PE remarkable for LLQ tenderness. Labs, CT abd. Dispo pending.    Dr. Garcia's Note: workup done as above to assess for possible source of GIB vs diverticulitis or colitis or other intra-abd process. Ct shows pancreatitis - while this doesn't completely correlate with location of pain, it does appear to be acute and may explain the fever. pt also found to be hyponatremic to 120 but is neuro intact and fully alert and oriented. pt will be admitted for further eval and management of pancreatitis, hyponatremia, trending hb although no obvious source of gib or of significant bleeding at this time.

## 2022-06-04 NOTE — H&P ADULT - PROBLEM SELECTOR PLAN 7
Indeterminate cystic lesion in the right kidney measuring up to 2.1 cm which can also be characterized on MRI.  Plan:   - f/u w/ outpatient Indeterminate cystic lesion in the right kidney measuring up to 2.1 cm which can also be characterized on MRI.  Plan:   - f/u w/ outpatient PCP for routine imaging

## 2022-06-04 NOTE — ED ADULT NURSE NOTE - NS ED NOTE ABUSE RESPONSE YN
Patient was seen on Friday, November 22 and was given a referral for nutrition services  He called the number given and was told the first available was 1 week before his follow up with [facility] on 2/24/20  He wanted to know if you want him to r/s his follow up with [facility] or if you want to refer him somewhere else? His number is [phone]  Thank you  Yes

## 2022-06-04 NOTE — H&P ADULT - ASSESSMENT
72 y/o M w/ pmh of GERD, Marcial's Esophagus (Last EGD in 4/2022 w/ no acute changes), Gout, Afibb (on Eliquis), HTN who presents w/ abd pain. Sharp, up to 8/10 pains initially diffuse, now localized upper Lt quadrant/ mid- epigastrum, worsened after eating for 4 days likely 2/2 Acute pancreatitis, unknown etiology, found on CT with multiple peripancreatic fluid collections 72 y/o M w/ pmh of GERD, Marcial's Esophagus (Last EGD in 4/2022 w/ no acute changes), Gout, Afibb (on Eliquis), HTN who presents w/ abd pain. Sharp, up to 8/10 pains initially diffuse, now localized upper Lt quadrant/ mid- epigastrum worsened after eating for 4 days likely 2/2 Acute pancreatitis, unknown etiology, found on CT with multiple peripancreatic fluid collections.

## 2022-06-04 NOTE — H&P ADULT - NSHPSOCIALHISTORY_GEN_ALL_CORE
Retired   Former Tobacco user, 40pack year history, quit 2012  No prior significant alcohol use  Ambulates independently  lives at home with wife

## 2022-06-04 NOTE — ED PROVIDER NOTE - CARE PLAN
Principal Discharge DX:	Abdominal pain   1 Principal Discharge DX:	Pancreatitis  Secondary Diagnosis:	BRBPR (bright red blood per rectum)

## 2022-06-04 NOTE — H&P ADULT - PROBLEM SELECTOR PLAN 2
CT: Several pancreatic and peripancreatic fluid collections, 4.5 x 3.2 x 3.8 cm; collection in the pancreatic body measures 2.0 x 1.6 x 1.4 cm; cystic lesions in the pancreatic head, the larger of which measures 2 cm.  Plan: CT: Several pancreatic and peripancreatic fluid collections, 4.5 x 3.2 x 3.8 cm; collection in the pancreatic body measures 2.0 x 1.6 x 1.4 cm; cystic lesions in the pancreatic head, the larger of which measures 2 cm.  Abscess versus walled off necrosis  Plan:  - start IV Zosyn  - GI consult  - Surgery consult  - MRI abdomen CT: Several pancreatic and peripancreatic fluid collections, 4.5 x 3.2 x 3.8 cm; collection in the pancreatic body measures 2.0 x 1.6 x 1.4 cm; cystic lesions in the pancreatic head, the larger of which measures 2 cm.  Abscess versus walled off necrosis    - start IV Meropenem  - GI consult  - Surgery consult  - consider MRI abdomen versus EUS pending GI reccs

## 2022-06-04 NOTE — ED PROVIDER NOTE - NS ED ROS FT
General: +fever, chills  HENT: no nasal congestion, no sore throat  Eyes: no visual changes, no blurred vision  Neck: no neck pain  CV: denies chest pain, no palpitations  Resp: no difficulty breathing, no cough  Abdominal: no nausea, no vomiting, no diarrhea, +abdominal pain  MSK: no muscle aches  Neuro: no headaches  Skin: no rashes

## 2022-06-04 NOTE — H&P ADULT - PROBLEM SELECTOR PLAN 8
Last EGD 4/2022 with no new/ acute findings  Follows with Dr. Acevedo, [facility]

## 2022-06-04 NOTE — H&P ADULT - PROBLEM SELECTOR PLAN 3
Hypoo Hypo-osmolar Hyponatremia likely 2/2 SAIDH due to pancreatic abscesses, peripancreatic, peritoneal fluid spacing  Na 120, Serum 249, Uosm 275  s/p 500cc bolus in the --> 121, likely SIADH and self- correcting with fluids  Plan:   - c/w management for pancreatitis  - LR 100cc/hr for pancreatitis   - f/u BMP 4-6hrs after isotonic fluids Hypo-osmolar Hyponatremia likely 2/2 SAIDH due to pancreatic abscesses, peripancreatic, peritoneal fluid spacing  Na 120, Serum 249, Uosm 275  s/p 500cc bolus in the --> 121, likely SIADH and self- correcting with fluids    - c/w management for pancreatitis  - LR 100cc/hr for pancreatitis   - f/u BMP 4-6hrs after isotonic fluids  - hold loop diuretics, HCTZ

## 2022-06-04 NOTE — H&P ADULT - PROBLEM SELECTOR PLAN 1
abd pain. Sharp, up to 8/10 pains initially diffuse, now localized upper Lt quadrant/ mid- epigastrium worsened after eating for 4 days likely 2/2 Acute pancreatitis, unknown etiology, found on CT at pancreatic body and tail, pancreatic ductal dilatation, with multiple peripancreatic fluid collections.  Plan: abd pain. Sharp, up to 8/10 pains initially diffuse, now localized upper Lt quadrant/ mid- epigastrium worsened after eating for 4 days likely 2/2 Acute pancreatitis, unknown etiology, found on CT at pancreatic body and tail, pancreatic ductal dilatation. No pleural effusions  Lipase 212  Ca 9.7  Plan:  - sP 500cc bolus in ED  - IVF hydration: LR 100cc/hr 12 hours  - vbg with lactate  - f/u BCx  - f/u Triglycerides  - f/u GI reccs, possible EUS?   - Clear liquid diet  - VS Q4hrs abd pain. Sharp, up to 8/10 pains initially diffuse, now localized upper Lt quadrant/ mid- epigastrium worsened after eating for 4 days likely 2/2 Acute pancreatitis, unknown etiology, found on CT at pancreatic body and tail, pancreatic ductal dilatation. No pleural effusions  Possible drug induced versus GI infection (Colonic thickening on CT)- omeprazole, HCTZ, Crestor home meds  Lipase 212  Ca 9.7  - sP 500cc bolus in ED  - IVF hydration: LR 100cc/hr 12 hours  - trend vbg with lactate  - f/u BCx  - f/u Triglycerides  - f/u GI reccs, possible EUS?   - NPO   - VS Q4hrs Acute pancreatitis, unknown etiology, found on CT at pancreatic body and tail, pancreatic ductal dilatation. No pleural effusions  Possibly 2/2 chronic process given various fluid collections; drug induced (omeprazole, HCTZ, Crestor home meds) versus GI infection (Colonic thickening on CT)  Lipase 212  Ca 9.7  - sP 500cc bolus in ED  - IVF hydration: LR 100cc/hr 12 hours  - trend vbg with lactate  - f/u BCx  - f/u Triglycerides  - f/u GI reccs, possible EUS?   - NPO   - VS Q4hrs

## 2022-06-04 NOTE — H&P ADULT - ATTENDING COMMENTS
Briefly, this is a 72 y/o M w/ mhx of gout, afib on eliquis, DM, HTN presents' w/ abd pain, fever and dark stool x 3 days. Found to have acute pancreatitis and hyponatremia. Has been afebrile here. Labs significant for WBC 17K, INR 2.71, Na 120, K+ 3.4, Cr 1.26, lipase 215. CT A/P found edematous enlargement of the pancreatic body and tail with surrounding inflammatory changes and fluid, several pancreatic and peripancreatic fluid collections and that the pancreatic duct is dilated in the pancreatic neck and proximal body    Acute Pancreatitis  ddx includes 2/2 pancreatic stricture given pancreatic duct dilation vs mediation induced as Omeprazole, HCTZ and Crestor all have been associated with pancreatitis vs idiopathic vs 2/2 hypertriglyceridemia  Start IVF  Leukocytosis and fever at home could be 2/2 pancreatitis itself vs infected collections. Would start Meropenem in interim  GI c/s given pancreatic duct findings    Hyponatremia  Suspect hypovolemic hyponatremia as had slight improvement with fluid. Urine Na 16.   IVF as above  Trend BMP Q6   Hold Hctz

## 2022-06-04 NOTE — H&P ADULT - PROBLEM SELECTOR PLAN 5
ECG, sinus rythym  Home regimen: Eliquis 5mg daily  [id number]: 2pts; 2.2% stroke risk   Dr. Jana Llamas is outpatient Cardiologist  - per pt will f/u outpatient for scheduled ECG, Echo, stress test  - restart eliquis 5mg daily once GI and Surgery have a plan on whether or not procedural interventions

## 2022-06-05 LAB
A1C WITH ESTIMATED AVERAGE GLUCOSE RESULT: 5.7 % — HIGH (ref 4–5.6)
ALBUMIN SERPL ELPH-MCNC: 3.3 G/DL — SIGNIFICANT CHANGE UP (ref 3.3–5)
ALP SERPL-CCNC: 103 U/L — SIGNIFICANT CHANGE UP (ref 40–120)
ALT FLD-CCNC: 10 U/L — SIGNIFICANT CHANGE UP (ref 10–45)
ANION GAP SERPL CALC-SCNC: 13 MMOL/L — SIGNIFICANT CHANGE UP (ref 5–17)
ANION GAP SERPL CALC-SCNC: 16 MMOL/L — SIGNIFICANT CHANGE UP (ref 5–17)
APTT BLD: 35.6 SEC — HIGH (ref 27.5–35.5)
AST SERPL-CCNC: 15 U/L — SIGNIFICANT CHANGE UP (ref 10–40)
BILIRUB SERPL-MCNC: 0.5 MG/DL — SIGNIFICANT CHANGE UP (ref 0.2–1.2)
BUN SERPL-MCNC: 20 MG/DL — SIGNIFICANT CHANGE UP (ref 7–23)
BUN SERPL-MCNC: 20 MG/DL — SIGNIFICANT CHANGE UP (ref 7–23)
CALCIUM SERPL-MCNC: 9.6 MG/DL — SIGNIFICANT CHANGE UP (ref 8.4–10.5)
CALCIUM SERPL-MCNC: 9.7 MG/DL — SIGNIFICANT CHANGE UP (ref 8.4–10.5)
CHLORIDE SERPL-SCNC: 87 MMOL/L — LOW (ref 96–108)
CHLORIDE SERPL-SCNC: 91 MMOL/L — LOW (ref 96–108)
CHOLEST SERPL-MCNC: 90 MG/DL — SIGNIFICANT CHANGE UP
CHOLEST SERPL-MCNC: 94 MG/DL — SIGNIFICANT CHANGE UP
CO2 SERPL-SCNC: 22 MMOL/L — SIGNIFICANT CHANGE UP (ref 22–31)
CO2 SERPL-SCNC: 24 MMOL/L — SIGNIFICANT CHANGE UP (ref 22–31)
CREAT SERPL-MCNC: 1.16 MG/DL — SIGNIFICANT CHANGE UP (ref 0.5–1.3)
CREAT SERPL-MCNC: 1.26 MG/DL — SIGNIFICANT CHANGE UP (ref 0.5–1.3)
CULTURE RESULTS: SIGNIFICANT CHANGE UP
EGFR: 61 ML/MIN/1.73M2 — SIGNIFICANT CHANGE UP
EGFR: 67 ML/MIN/1.73M2 — SIGNIFICANT CHANGE UP
ESTIMATED AVERAGE GLUCOSE: 117 MG/DL — HIGH (ref 68–114)
GLUCOSE SERPL-MCNC: 138 MG/DL — HIGH (ref 70–99)
GLUCOSE SERPL-MCNC: 80 MG/DL — SIGNIFICANT CHANGE UP (ref 70–99)
HCT VFR BLD CALC: 28.3 % — LOW (ref 39–50)
HDLC SERPL-MCNC: 38 MG/DL — LOW
HDLC SERPL-MCNC: 43 MG/DL — SIGNIFICANT CHANGE UP
HGB BLD-MCNC: 9.7 G/DL — LOW (ref 13–17)
INR BLD: 2 RATIO — HIGH (ref 0.88–1.16)
LIPID PNL WITH DIRECT LDL SERPL: 38 MG/DL — SIGNIFICANT CHANGE UP
LIPID PNL WITH DIRECT LDL SERPL: 38 MG/DL — SIGNIFICANT CHANGE UP
MAGNESIUM SERPL-MCNC: 1.4 MG/DL — LOW (ref 1.6–2.6)
MCHC RBC-ENTMCNC: 28 PG — SIGNIFICANT CHANGE UP (ref 27–34)
MCHC RBC-ENTMCNC: 34.3 GM/DL — SIGNIFICANT CHANGE UP (ref 32–36)
MCV RBC AUTO: 81.8 FL — SIGNIFICANT CHANGE UP (ref 80–100)
NON HDL CHOLESTEROL: 51 MG/DL — SIGNIFICANT CHANGE UP
NON HDL CHOLESTEROL: 52 MG/DL — SIGNIFICANT CHANGE UP
NRBC # BLD: 0 /100 WBCS — SIGNIFICANT CHANGE UP (ref 0–0)
PHOSPHATE SERPL-MCNC: 3.9 MG/DL — SIGNIFICANT CHANGE UP (ref 2.5–4.5)
PLATELET # BLD AUTO: 325 K/UL — SIGNIFICANT CHANGE UP (ref 150–400)
POTASSIUM SERPL-MCNC: 3.8 MMOL/L — SIGNIFICANT CHANGE UP (ref 3.5–5.3)
POTASSIUM SERPL-MCNC: 3.9 MMOL/L — SIGNIFICANT CHANGE UP (ref 3.5–5.3)
POTASSIUM SERPL-SCNC: 3.8 MMOL/L — SIGNIFICANT CHANGE UP (ref 3.5–5.3)
POTASSIUM SERPL-SCNC: 3.9 MMOL/L — SIGNIFICANT CHANGE UP (ref 3.5–5.3)
PROT SERPL-MCNC: 6.5 G/DL — SIGNIFICANT CHANGE UP (ref 6–8.3)
PROTHROM AB SERPL-ACNC: 23.4 SEC — HIGH (ref 10.5–13.4)
RBC # BLD: 3.46 M/UL — LOW (ref 4.2–5.8)
RBC # FLD: 13.2 % — SIGNIFICANT CHANGE UP (ref 10.3–14.5)
SODIUM SERPL-SCNC: 125 MMOL/L — LOW (ref 135–145)
SODIUM SERPL-SCNC: 128 MMOL/L — LOW (ref 135–145)
SPECIMEN SOURCE: SIGNIFICANT CHANGE UP
TRIGL SERPL-MCNC: 67 MG/DL — SIGNIFICANT CHANGE UP
TRIGL SERPL-MCNC: 73 MG/DL — SIGNIFICANT CHANGE UP
WBC # BLD: 15.54 K/UL — HIGH (ref 3.8–10.5)
WBC # FLD AUTO: 15.54 K/UL — HIGH (ref 3.8–10.5)

## 2022-06-05 PROCEDURE — 99232 SBSQ HOSP IP/OBS MODERATE 35: CPT | Mod: GC

## 2022-06-05 RX ORDER — SODIUM CHLORIDE 9 MG/ML
1000 INJECTION, SOLUTION INTRAVENOUS
Refills: 0 | Status: DISCONTINUED | OUTPATIENT
Start: 2022-06-05 | End: 2022-06-06

## 2022-06-05 RX ORDER — SODIUM CHLORIDE 9 MG/ML
1000 INJECTION, SOLUTION INTRAVENOUS
Refills: 0 | Status: DISCONTINUED | OUTPATIENT
Start: 2022-06-05 | End: 2022-06-05

## 2022-06-05 RX ORDER — FAMOTIDINE 10 MG/ML
20 INJECTION INTRAVENOUS EVERY 12 HOURS
Refills: 0 | Status: DISCONTINUED | OUTPATIENT
Start: 2022-06-05 | End: 2022-06-05

## 2022-06-05 RX ORDER — FAMOTIDINE 10 MG/ML
20 INJECTION INTRAVENOUS EVERY 12 HOURS
Refills: 0 | Status: DISCONTINUED | OUTPATIENT
Start: 2022-06-05 | End: 2022-06-07

## 2022-06-05 RX ORDER — SUCRALFATE 1 G
1 TABLET ORAL
Refills: 0 | Status: DISCONTINUED | OUTPATIENT
Start: 2022-06-05 | End: 2022-06-07

## 2022-06-05 RX ADMIN — MEROPENEM 100 MILLIGRAM(S): 1 INJECTION INTRAVENOUS at 21:59

## 2022-06-05 RX ADMIN — MEROPENEM 100 MILLIGRAM(S): 1 INJECTION INTRAVENOUS at 05:09

## 2022-06-05 RX ADMIN — Medication 1 GRAM(S): at 17:20

## 2022-06-05 RX ADMIN — BISOPROLOL FUMARATE 10 MILLIGRAM(S): 10 TABLET, FILM COATED ORAL at 05:09

## 2022-06-05 RX ADMIN — FAMOTIDINE 20 MILLIGRAM(S): 10 INJECTION INTRAVENOUS at 17:22

## 2022-06-05 RX ADMIN — TAMSULOSIN HYDROCHLORIDE 0.8 MILLIGRAM(S): 0.4 CAPSULE ORAL at 21:59

## 2022-06-05 RX ADMIN — Medication 30 MILLILITER(S): at 17:20

## 2022-06-05 RX ADMIN — MEROPENEM 100 MILLIGRAM(S): 1 INJECTION INTRAVENOUS at 13:05

## 2022-06-05 RX ADMIN — SODIUM CHLORIDE 100 MILLILITER(S): 9 INJECTION, SOLUTION INTRAVENOUS at 08:05

## 2022-06-05 RX ADMIN — SODIUM CHLORIDE 100 MILLILITER(S): 9 INJECTION, SOLUTION INTRAVENOUS at 10:34

## 2022-06-05 RX ADMIN — Medication 30 MILLILITER(S): at 21:59

## 2022-06-05 RX ADMIN — Medication 30 MILLILITER(S): at 13:06

## 2022-06-05 RX ADMIN — ROSUVASTATIN CALCIUM 10 MILLIGRAM(S): 5 TABLET ORAL at 22:00

## 2022-06-05 RX ADMIN — Medication 1 DROP(S): at 17:20

## 2022-06-05 RX ADMIN — Medication 200 MILLIGRAM(S): at 11:26

## 2022-06-05 NOTE — PROGRESS NOTE ADULT - PROBLEM SELECTOR PLAN 3
Hypo-osmolar Hyponatremia likely 2/2 SAIDH due to pancreatic abscesses, peripancreatic, peritoneal fluid spacing  Na 120, Serum 249, Uosm 275  s/p 500cc bolus in the --> 121, likely SIADH and self- correcting with fluids    - c/w management for pancreatitis  - LR 100cc/hr for pancreatitis   - f/u BMP 4-6hrs today after isotonic fluids, if Na decreasing ok to switch to NS  - hold loop diuretics, HCTZ Takes omeprazole 40mg, recently increased to bid 6/3  - STOP IV pantoprazole 40mg BID  - Start maalox Q4hrs for Dyspepsia, sucralfate 1g BID  - consider starting Famotidine IV BID

## 2022-06-05 NOTE — PROGRESS NOTE ADULT - PROBLEM SELECTOR PLAN 1
Acute pancreatitis, unknown etiology, found on CT at pancreatic body and tail, pancreatic ductal dilatation. No pleural effusions  Possibly 2/2 chronic process given various fluid collections; drug induced (omeprazole, HCTZ, Crestor home meds) versus GI infection (Colonic thickening on CT)  Lipase 212  Ca 9.7  TG wnl  - MRI Abdomen ordered.   - f/u with Pt's private dentist to see if dentures, implants placed 2012 are MRI compatible  - c/w IVF hydration: LR 100cc/hr 12 hours  - f/u BCx  - f/u GI reccs, possible EUS?   - clear liquid diet, advance as tolerated  - VS Q4hrs Acute pancreatitis, unknown etiology, found on CT at pancreatic body and tail, pancreatic ductal dilatation. No pleural effusions  Possibly 2/2 chronic process given various fluid collections; drug induced (omeprazole, HCTZ, Crestor home meds) versus GI infection (Colonic thickening on CT)  Lipase 212  Ca 9.7  TG wnl  - MRI Abdomen ordered.   - Per MRI technicion, titanium implants and dental bridge placed 2012 are MRI safe  - c/w IVF hydration: LR 100cc/hr 12 hours  - f/u BCx  - f/u GI reccs, possible EUS?   - clear liquid diet, advance as tolerated  - VS Q4hrs

## 2022-06-05 NOTE — PROGRESS NOTE ADULT - PROBLEM SELECTOR PLAN 5
ECG, sinus rythym  Home regimen: Eliquis 5mg daily  [id number]: 2pts; 2.2% stroke risk   Dr. Jana Llamas is outpatient Cardiologist  - per pt will f/u outpatient for scheduled ECG, Echo, stress test  - restart eliquis 5mg daily once GI and Surgery have a plan on whether or not procedural interventions home regimen: Bisoprolol 10mg/HCTZ 6.25mg  - hold HCTZ i/s/o hyponatremia  - c/w Bisoprolol 10mg daily

## 2022-06-05 NOTE — PATIENT PROFILE ADULT - FALL HARM RISK - HARM RISK INTERVENTIONS

## 2022-06-05 NOTE — PROGRESS NOTE ADULT - SUBJECTIVE AND OBJECTIVE BOX
Anila Ruff MD  Internal Medicine PGY1  [facility]: [id number]/ NS: [phone]    DATE OF SERVICE: 22 @ 07:21    Patient is a 71y old  Male who presents with a chief complaint of Abdominal pain (2022 05:12)      SUBJECTIVE / OVERNIGHT EVENTS:  no acute events overnight.   Seen by surgery yesterday, not indicated for surgical intervention at this time.     MEDICATIONS  (STANDING):  allopurinol 200 milliGRAM(s) Oral daily  bisoprolol   Tablet 10 milliGRAM(s) Oral daily  lactated ringers. 1000 milliLiter(s) (100 mL/Hr) IV Continuous <Continuous>  meropenem  IVPB 1000 milliGRAM(s) IV Intermittent every 8 hours  pantoprazole  Injectable 40 milliGRAM(s) IV Push every 12 hours  rosuvastatin 10 milliGRAM(s) Oral at bedtime  tamsulosin 0.8 milliGRAM(s) Oral at bedtime    MEDICATIONS  (PRN):  acetaminophen     Tablet .. 650 milliGRAM(s) Oral every 6 hours PRN Temp greater or equal to 38C (100.4F), Mild Pain (1 - 3)  aluminum hydroxide/magnesium hydroxide/simethicone Suspension 30 milliLiter(s) Oral every 4 hours PRN Dyspepsia  hydrocortisone 2.5% Rectal Cream 1 Application(s) Rectal three times a day PRN hemorrhoids  melatonin 3 milliGRAM(s) Oral at bedtime PRN Insomnia  ondansetron Injectable 4 milliGRAM(s) IV Push every 8 hours PRN Nausea and/or Vomiting      Vital Signs Last 24 Hrs  T(C): 37.4 (2022 22:12), Max: 37.4 (2022 22:12)  T(F): 99.3 (2022 22:12), Max: 99.3 (2022 22:12)  HR: 85 (2022 22:12) (74 - 89)  BP: 134/59 (2022 22:12) (129/70 - 160/85)  BP(mean): --  RR: 18 (2022 22:12) (16 - 18)  SpO2: 98% (2022 22:12) (97% - 98%)  CAPILLARY BLOOD GLUCOSE        I&O's Summary    2022 07: 07:00  --------------------------------------------------------  IN: 1040 mL / OUT: 1000 mL / NET: 40 mL        PHYSICAL EXAM:  GENERAL: NAD, well-developed  HEAD:  Atraumatic, Normocephalic  EYES: EOMI, PERRLA, conjunctiva and sclera clear  NECK: Supple, No JVD  CHEST/LUNG: Clear to auscultation bilaterally; No wheeze  HEART: Regular rate and rhythm; No murmurs, rubs, or gallops  ABDOMEN: Soft, Nontender, Nondistended; Bowel sounds present  EXTREMITIES:  2+ Peripheral Pulses, No clubbing, cyanosis, or edema  PSYCH: AAOx3  NEUROLOGY: non-focal  SKIN: No rashes or lesions    LABS:                        10.7   17.77 )-----------( 333      ( 2022 03:11 )             30.3     06-04    124<L>  |  85<L>  |  16  ----------------------------<  93  3.8   |  22  |  1.01    Ca    10.0      2022 18:01  Phos  1.9     06-04  Mg     1.1     06-04    TPro  7.2  /  Alb  3.9  /  TBili  0.9  /  DBili  x   /  AST  13  /  ALT  10  /  AlkPhos  104  06-04    PT/INR - ( 2022 03:11 )   PT: 31.7 sec;   INR: 2.71 ratio         PTT - ( 2022 03:11 )  PTT:40.7 sec      Urinalysis Basic - ( 2022 05:40 )    Color: Light Yellow / Appearance: Clear / S.029 / pH: x  Gluc: x / Ketone: Small  / Bili: Negative / Urobili: Negative   Blood: x / Protein: 30 mg/dL / Nitrite: Negative   Leuk Esterase: Negative / RBC: 1 /hpf / WBC 1 /HPF   Sq Epi: x / Non Sq Epi: 0 /hpf / Bacteria: Negative        RADIOLOGY & ADDITIONAL TESTS:    Imaging Personally Reviewed:    Consultant(s) Notes Reviewed:      Care Discussed with Consultants/Other Providers:   Anila Ruff MD  Internal Medicine PGY1  [facility]: [id number]/ NS: [phone]    DATE OF SERVICE: 22 @ 07:21    Patient is a 71y old  Male who presents with a chief complaint of Abdominal pain (2022 05:12)      SUBJECTIVE / OVERNIGHT EVENTS:  no acute events overnight.   No emesis, No BM's yet since admission.   Seen by surgery yesterday, not indicated for surgical intervention at this time.   Patient reports that he cannot tolerate Pantoprazole, it gives him increased phlegm, and does not  relieve his GERD symptoms when trailed on that outpatient.   He reports he was recently on omeprazole, wasn't working, was increased to BID, then he came in to ED.   Afebrile, hemodynamically stable.     MEDICATIONS  (STANDING):  allopurinol 200 milliGRAM(s) Oral daily  bisoprolol   Tablet 10 milliGRAM(s) Oral daily  lactated ringers. 1000 milliLiter(s) (100 mL/Hr) IV Continuous <Continuous>  meropenem  IVPB 1000 milliGRAM(s) IV Intermittent every 8 hours  pantoprazole  Injectable 40 milliGRAM(s) IV Push every 12 hours  rosuvastatin 10 milliGRAM(s) Oral at bedtime  tamsulosin 0.8 milliGRAM(s) Oral at bedtime    MEDICATIONS  (PRN):  acetaminophen     Tablet .. 650 milliGRAM(s) Oral every 6 hours PRN Temp greater or equal to 38C (100.4F), Mild Pain (1 - 3)  aluminum hydroxide/magnesium hydroxide/simethicone Suspension 30 milliLiter(s) Oral every 4 hours PRN Dyspepsia  hydrocortisone 2.5% Rectal Cream 1 Application(s) Rectal three times a day PRN hemorrhoids  melatonin 3 milliGRAM(s) Oral at bedtime PRN Insomnia  ondansetron Injectable 4 milliGRAM(s) IV Push every 8 hours PRN Nausea and/or Vomiting      Vital Signs Last 24 Hrs  T(C): 37.4 (2022 22:12), Max: 37.4 (2022 22:12)  T(F): 99.3 (2022 22:12), Max: 99.3 (2022 22:12)  HR: 85 (2022 22:12) (74 - 89)  BP: 134/59 (2022 22:12) (129/70 - 160/85)  BP(mean): --  RR: 18 (2022 22:12) (16 - 18)  SpO2: 98% (2022 22:12) (97% - 98%)  CAPILLARY BLOOD GLUCOSE        I&O's Summary    2022 07:01  -  2022 07:00  --------------------------------------------------------  IN: 1040 mL / OUT: 1000 mL / NET: 40 mL        PHYSICAL EXAM:  GENERAL: NAD, well-developed  HEAD:  Atraumatic, Normocephalic  EYES:  +Rt eye severe lateral strabismus, NC/AT, EOMI, PERRLA, conjunctiva and sclera clear  NECK: Supple, No JVD  CHEST/LUNG: Clear to auscultation bilaterally; No wheeze  HEART: Regular rate and rhythm; No murmurs, rubs, or gallops  ABDOMEN: Soft, Nontender, Nondistended; Bowel sounds present  EXTREMITIES:  2+ Peripheral Pulses, No clubbing, cyanosis, or edema  PSYCH: AAOx3  NEUROLOGY: non-focal  SKIN: No rashes or lesions    LABS:                        10.7   17.77 )-----------( 333      ( 2022 03:11 )             30.3     06-04    124<L>  |  85<L>  |  16  ----------------------------<  93  3.8   |  22  |  1.01    Ca    10.0      2022 18:01  Phos  1.9     06-04  Mg     1.1     06-04    TPro  7.2  /  Alb  3.9  /  TBili  0.9  /  DBili  x   /  AST  13  /  ALT  10  /  AlkPhos  104  06-04    PT/INR - ( 2022 03:11 )   PT: 31.7 sec;   INR: 2.71 ratio         PTT - ( 2022 03:11 )  PTT:40.7 sec      Urinalysis Basic - ( 2022 05:40 )    Color: Light Yellow / Appearance: Clear / S.029 / pH: x  Gluc: x / Ketone: Small  / Bili: Negative / Urobili: Negative   Blood: x / Protein: 30 mg/dL / Nitrite: Negative   Leuk Esterase: Negative / RBC: 1 /hpf / WBC 1 /HPF   Sq Epi: x / Non Sq Epi: 0 /hpf / Bacteria: Negative        RADIOLOGY & ADDITIONAL TESTS:    Imaging Personally Reviewed:    Consultant(s) Notes Reviewed:      Care Discussed with Consultants/Other Providers:

## 2022-06-05 NOTE — PROGRESS NOTE ADULT - PROBLEM SELECTOR PLAN 4
home regimen: Bisoprolol 10mg/HCTZ 6.25mg  - hold HCTZ i/s/o hyponatremia  - c/w Bisoprolol 10mg daily Hypo-osmolar Hyponatremia likely 2/2 SAIDH due to pancreatic abscesses, peripancreatic, peritoneal fluid spacing  Na 120, Serum 249, Uosm 275  s/p 500cc bolus in the --> 121, likely SIADH and self- correcting with fluids    - c/w management for pancreatitis  - LR 100cc/hr for pancreatitis   - f/u BMP 4-6hrs today after isotonic fluids, if Na decreasing ok to switch to NS  - hold loop diuretics, HCTZ

## 2022-06-05 NOTE — CONSULT NOTE ADULT - TIME BILLING
I saw and evaluated patient.  I agree with residents note excerpt would not pursue interventional radiology consultation  consistent with complicated pancreatitis as noted  I spoke to medical team  agree that further work up to exclude malignancy is appropriate and MRI suggested  unlikely to need surgical interventions  I updated patient with plan of care

## 2022-06-05 NOTE — PROGRESS NOTE ADULT - PROBLEM SELECTOR PLAN 7
Indeterminate cystic lesion in the right kidney measuring up to 2.1 cm which can also be characterized on MRI.  Plan:   - f/u w/ outpatient PCP for routine imaging DVT Prophylaxis: plan to restart home Eliquis 5mg daily after f/u with GI, INR 2.0  Diet: NPO, pending GI reccs  Increase activity as tolerated

## 2022-06-05 NOTE — PROGRESS NOTE ADULT - PROBLEM SELECTOR PLAN 6
DVT Prophylaxis: plan to restart home Eliquis 5mg daily after f/u with GI, INR 2.0  Diet: NPO, pending GI reccs  Increase activity as tolerated ECG, sinus rythym  Home regimen: Eliquis 5mg daily  [id number]: 2pts; 2.2% stroke risk   Dr. Jana Llamas is outpatient Cardiologist  - per pt will f/u outpatient for scheduled ECG, Echo, stress test  - restart eliquis 5mg daily once GI and Surgery have a plan on whether or not procedural interventions

## 2022-06-05 NOTE — PROGRESS NOTE ADULT - PROBLEM SELECTOR PLAN 9
Takes omeprazole 40mg, recently increased to bid 6/3  - STOP IV pantoprazole 40mg BID  - Start maalox Q4hrs for Dyspepsia, sucralfate 1g BID  - consider starting Famotidine IV BID Last EGD 4/2022 with no new/ acute findings  Follows with Dr. Acevedo, [facility]

## 2022-06-05 NOTE — PROGRESS NOTE ADULT - PROBLEM SELECTOR PLAN 8
Last EGD 4/2022 with no new/ acute findings  Follows with Dr. Acevedo, [facility] Indeterminate cystic lesion in the right kidney measuring up to 2.1 cm which can also be characterized on MRI.  Plan:   - f/u w/ outpatient PCP for routine imaging

## 2022-06-05 NOTE — CONSULT NOTE ADULT - ASSESSMENT
72 y/o M w/ pmh of GERD, Marcial's Esophagus (Last EGD in 4/2022 w/ no acute changes), Gout, Afib (on Eliquis), HTN presenting with necrotizing pancreatitis secondary to possible medication side effect. General surgery consulted for recommendations for surgical management.     Recommendations  - No acute surgical intervention indicated; if clinical picture worsening and not responding to conservative management can discuss further intervention such as interventional radiology consult   - hemodynamically stable with pain well control pain medications as needed  - Continue medical management and workup of acute pancreatitis    Discussed with surgical attending Dr. Jermain Topete MD PGY2  ACS  [phone]  70 y/o M w/ pmh of GERD, Marcial's Esophagus (Last EGD in 4/2022 w/ no acute changes), Gout, Afib (on Eliquis), HTN presenting with necrotizing pancreatitis secondary to possible medication side effect. General surgery consulted for recommendations for surgical management.     Recommendations  - No acute surgical intervention indicated; if clinical picture worsening and not responding to conservative management can discuss further intervention such as interventional radiology consult   - hemodynamically stable with pain well control pain medications as needed  - Continue medical management and workup of acute pancreatitis  - re-call surgery with any further questions    Dylan Topete MD PGY2  ACS  [phone]

## 2022-06-06 LAB
ALBUMIN SERPL ELPH-MCNC: 3.4 G/DL — SIGNIFICANT CHANGE UP (ref 3.3–5)
ALP SERPL-CCNC: 102 U/L — SIGNIFICANT CHANGE UP (ref 40–120)
ALT FLD-CCNC: 16 U/L — SIGNIFICANT CHANGE UP (ref 10–45)
ANION GAP SERPL CALC-SCNC: 13 MMOL/L — SIGNIFICANT CHANGE UP (ref 5–17)
AST SERPL-CCNC: 16 U/L — SIGNIFICANT CHANGE UP (ref 10–40)
BASOPHILS # BLD AUTO: 0.04 K/UL — SIGNIFICANT CHANGE UP (ref 0–0.2)
BASOPHILS NFR BLD AUTO: 0.4 % — SIGNIFICANT CHANGE UP (ref 0–2)
BILIRUB SERPL-MCNC: 0.4 MG/DL — SIGNIFICANT CHANGE UP (ref 0.2–1.2)
BUN SERPL-MCNC: 17 MG/DL — SIGNIFICANT CHANGE UP (ref 7–23)
CALCIUM SERPL-MCNC: 10.1 MG/DL — SIGNIFICANT CHANGE UP (ref 8.4–10.5)
CHLORIDE SERPL-SCNC: 94 MMOL/L — LOW (ref 96–108)
CO2 SERPL-SCNC: 26 MMOL/L — SIGNIFICANT CHANGE UP (ref 22–31)
CREAT SERPL-MCNC: 1.03 MG/DL — SIGNIFICANT CHANGE UP (ref 0.5–1.3)
EGFR: 77 ML/MIN/1.73M2 — SIGNIFICANT CHANGE UP
EOSINOPHIL # BLD AUTO: 0.04 K/UL — SIGNIFICANT CHANGE UP (ref 0–0.5)
EOSINOPHIL NFR BLD AUTO: 0.4 % — SIGNIFICANT CHANGE UP (ref 0–6)
GLUCOSE SERPL-MCNC: 116 MG/DL — HIGH (ref 70–99)
HCT VFR BLD CALC: 28.9 % — LOW (ref 39–50)
HGB BLD-MCNC: 9.7 G/DL — LOW (ref 13–17)
IMM GRANULOCYTES NFR BLD AUTO: 0.4 % — SIGNIFICANT CHANGE UP (ref 0–1.5)
LYMPHOCYTES # BLD AUTO: 0.83 K/UL — LOW (ref 1–3.3)
LYMPHOCYTES # BLD AUTO: 8.6 % — LOW (ref 13–44)
MAGNESIUM SERPL-MCNC: 1.6 MG/DL — SIGNIFICANT CHANGE UP (ref 1.6–2.6)
MCHC RBC-ENTMCNC: 28.1 PG — SIGNIFICANT CHANGE UP (ref 27–34)
MCHC RBC-ENTMCNC: 33.6 GM/DL — SIGNIFICANT CHANGE UP (ref 32–36)
MCV RBC AUTO: 83.8 FL — SIGNIFICANT CHANGE UP (ref 80–100)
MONOCYTES # BLD AUTO: 0.79 K/UL — SIGNIFICANT CHANGE UP (ref 0–0.9)
MONOCYTES NFR BLD AUTO: 8.2 % — SIGNIFICANT CHANGE UP (ref 2–14)
NEUTROPHILS # BLD AUTO: 7.9 K/UL — HIGH (ref 1.8–7.4)
NEUTROPHILS NFR BLD AUTO: 82 % — HIGH (ref 43–77)
NRBC # BLD: 0 /100 WBCS — SIGNIFICANT CHANGE UP (ref 0–0)
PHOSPHATE SERPL-MCNC: 2.7 MG/DL — SIGNIFICANT CHANGE UP (ref 2.5–4.5)
PLATELET # BLD AUTO: 326 K/UL — SIGNIFICANT CHANGE UP (ref 150–400)
POTASSIUM SERPL-MCNC: 3.9 MMOL/L — SIGNIFICANT CHANGE UP (ref 3.5–5.3)
POTASSIUM SERPL-SCNC: 3.9 MMOL/L — SIGNIFICANT CHANGE UP (ref 3.5–5.3)
PROT SERPL-MCNC: 6.2 G/DL — SIGNIFICANT CHANGE UP (ref 6–8.3)
RBC # BLD: 3.45 M/UL — LOW (ref 4.2–5.8)
RBC # FLD: 13.2 % — SIGNIFICANT CHANGE UP (ref 10.3–14.5)
SODIUM SERPL-SCNC: 133 MMOL/L — LOW (ref 135–145)
WBC # BLD: 9.64 K/UL — SIGNIFICANT CHANGE UP (ref 3.8–10.5)
WBC # FLD AUTO: 9.64 K/UL — SIGNIFICANT CHANGE UP (ref 3.8–10.5)

## 2022-06-06 PROCEDURE — 99232 SBSQ HOSP IP/OBS MODERATE 35: CPT | Mod: GC

## 2022-06-06 PROCEDURE — 74183 MRI ABD W/O CNTR FLWD CNTR: CPT | Mod: 26

## 2022-06-06 PROCEDURE — 99222 1ST HOSP IP/OBS MODERATE 55: CPT | Mod: GC

## 2022-06-06 RX ORDER — SODIUM CHLORIDE 9 MG/ML
1000 INJECTION, SOLUTION INTRAVENOUS
Refills: 0 | Status: DISCONTINUED | OUTPATIENT
Start: 2022-06-06 | End: 2022-06-07

## 2022-06-06 RX ADMIN — Medication 3 MILLIGRAM(S): at 22:11

## 2022-06-06 RX ADMIN — Medication 30 MILLILITER(S): at 22:12

## 2022-06-06 RX ADMIN — Medication 30 MILLILITER(S): at 10:16

## 2022-06-06 RX ADMIN — Medication 1 GRAM(S): at 16:53

## 2022-06-06 RX ADMIN — Medication 1 GRAM(S): at 05:40

## 2022-06-06 RX ADMIN — Medication 30 MILLILITER(S): at 14:37

## 2022-06-06 RX ADMIN — TAMSULOSIN HYDROCHLORIDE 0.8 MILLIGRAM(S): 0.4 CAPSULE ORAL at 22:12

## 2022-06-06 RX ADMIN — MEROPENEM 100 MILLIGRAM(S): 1 INJECTION INTRAVENOUS at 05:34

## 2022-06-06 RX ADMIN — Medication 200 MILLIGRAM(S): at 14:37

## 2022-06-06 RX ADMIN — BISOPROLOL FUMARATE 10 MILLIGRAM(S): 10 TABLET, FILM COATED ORAL at 05:37

## 2022-06-06 RX ADMIN — ROSUVASTATIN CALCIUM 10 MILLIGRAM(S): 5 TABLET ORAL at 22:10

## 2022-06-06 RX ADMIN — MEROPENEM 100 MILLIGRAM(S): 1 INJECTION INTRAVENOUS at 14:38

## 2022-06-06 RX ADMIN — Medication 30 MILLILITER(S): at 16:52

## 2022-06-06 RX ADMIN — FAMOTIDINE 20 MILLIGRAM(S): 10 INJECTION INTRAVENOUS at 05:38

## 2022-06-06 RX ADMIN — SODIUM CHLORIDE 100 MILLILITER(S): 9 INJECTION, SOLUTION INTRAVENOUS at 18:44

## 2022-06-06 RX ADMIN — Medication 1 DROP(S): at 05:38

## 2022-06-06 RX ADMIN — Medication 1 DROP(S): at 16:53

## 2022-06-06 RX ADMIN — Medication 30 MILLILITER(S): at 05:38

## 2022-06-06 RX ADMIN — FAMOTIDINE 20 MILLIGRAM(S): 10 INJECTION INTRAVENOUS at 16:58

## 2022-06-06 NOTE — PROGRESS NOTE ADULT - PROBLEM SELECTOR PLAN 6
ECG, sinus rythym  Home regimen: Eliquis 5mg daily  [id number]: 2pts; 2.2% stroke risk   Dr. Jana Llamas is outpatient Cardiologist  - per pt will f/u outpatient for scheduled ECG, Echo, stress test  - restart eliquis 5mg daily once GI and Surgery have a plan on whether or not procedural interventions

## 2022-06-06 NOTE — PROGRESS NOTE ADULT - PROBLEM SELECTOR PLAN 7
DVT Prophylaxis: plan to restart home Eliquis 5mg daily after f/u with GI, INR 2.0  Diet: NPO, pending GI reccs  Increase activity as tolerated

## 2022-06-06 NOTE — PROGRESS NOTE ADULT - PROBLEM SELECTOR PLAN 9
Last EGD 4/2022 with no new/ acute findings  Follows with Dr. Acevedo, [facility] GI

## 2022-06-06 NOTE — CONSULT NOTE ADULT - SUBJECTIVE AND OBJECTIVE BOX
Chief Complaint:  Patient is a 71y old  Male who presents with a chief complaint of Abdominal pain (06 Jun 2022 07:31)      HPI: 71M significant smoking Hx x 40 years (quit 20 years ago), GERD, Sierra's Esophagus (Last EGD in 4/2022 w/ no acute changes), Gout, Afib (on Eliquis), HTN presenting with abd pain, fevers, found to have acute pancreatitis now w/ collections + PD dilation for which adv GI is consulted.     Pt reports having epigastric abd pain, fevers -- denies n/v, weight loss, prior episodes of pancreatitis.     Allergies:  Norvasc (Flushing)  simvastatin (Faint; Flushing)  Vasotec (Faint; Flushing)      Home Medications:    Hospital Medications:  acetaminophen     Tablet .. 650 milliGRAM(s) Oral every 6 hours PRN  allopurinol 200 milliGRAM(s) Oral daily  aluminum hydroxide/magnesium hydroxide/simethicone Suspension 30 milliLiter(s) Oral every 4 hours  artificial tears (preservative free) Ophthalmic Solution 1 Drop(s) Both EYES two times a day  bisoprolol   Tablet 10 milliGRAM(s) Oral daily  famotidine Injectable 20 milliGRAM(s) IV Push every 12 hours  hydrocortisone 2.5% Rectal Cream 1 Application(s) Rectal three times a day PRN  lactated ringers. 1000 milliLiter(s) IV Continuous <Continuous>  melatonin 3 milliGRAM(s) Oral at bedtime PRN  meropenem  IVPB 1000 milliGRAM(s) IV Intermittent every 8 hours  ondansetron Injectable 4 milliGRAM(s) IV Push every 8 hours PRN  rosuvastatin 10 milliGRAM(s) Oral at bedtime  sucralfate suspension 1 Gram(s) Oral two times a day  tamsulosin 0.8 milliGRAM(s) Oral at bedtime      PMHX/PSHX:  Vertigo    Hypertension    Hyperlipemia    Sierra esophagus    S/P herniorrhaphy    S/P tonsillectomy    Gallbladder calculus    History of cholecystectomy        Family history:      Denies family history of colon cancer/polyps, stomach cancer/polyps, pancreatic cancer/masses, liver cancer/disease, ovarian cancer and endometrial cancer.    Social History:     Tob: Denies  EtOH: Denies  Illicit Drugs: Denies    ROS:     General:  No wt loss, fevers, chills, night sweats, fatigue  Eyes:  Good vision, no reported pain  ENT:  No sore throat, pain, runny nose, dysphagia  CV:  No pain, palpitations, hypo/hypertension  Pulm:  No dyspnea, cough, tachypnea, wheezing  GI: see above  :  No pain, bleeding, incontinence, nocturia  Muscle:  No pain, weakness  Neuro:  No weakness, tingling, memory problems  Psych:  No fatigue, insomnia, mood problems, depression  Endocrine:  No polyuria, polydipsia, cold/heat intolerance  Heme:  No petechiae, ecchymosis, easy bruisability  Skin:  No rash, tattoos, scars, edema    PHYSICAL EXAM:     GENERAL:  No acute distress  HEENT:  Normocephalic/atraumatic, no scleral icterus  CHEST:  no accessory muscle use  HEART:  Regular rate and rhythm  ABDOMEN:  Soft, +mildly tender, non-distended  EXTREMITIES: No cyanosis, clubbing, or edema  SKIN:  No rash/warm/dry  NEURO:  Alert and oriented x 3, no asterixis    Vital Signs:  Vital Signs Last 24 Hrs  T(C): 36.4 (06 Jun 2022 10:06), Max: 36.9 (05 Jun 2022 23:51)  T(F): 97.5 (06 Jun 2022 10:06), Max: 98.4 (05 Jun 2022 23:51)  HR: 66 (06 Jun 2022 10:06) (66 - 80)  BP: 126/65 (06 Jun 2022 10:06) (126/65 - 150/68)  BP(mean): --  RR: 18 (06 Jun 2022 10:06) (18 - 18)  SpO2: 96% (06 Jun 2022 10:06) (96% - 99%)  Daily     Daily     LABS:                        9.7    9.64  )-----------( 326      ( 06 Jun 2022 06:08 )             28.9     Mean Cell Volume: 83.8 fl (06-06-22 @ 06:08)    06-06    133<L>  |  94<L>  |  17  ----------------------------<  116<H>  3.9   |  26  |  1.03    Ca    10.1      06 Jun 2022 06:08  Phos  2.7     06-06  Mg     1.6     06-06    TPro  6.2  /  Alb  3.4  /  TBili  0.4  /  DBili  x   /  AST  16  /  ALT  16  /  AlkPhos  102  06-06    LIVER FUNCTIONS - ( 06 Jun 2022 06:08 )  Alb: 3.4 g/dL / Pro: 6.2 g/dL / ALK PHOS: 102 U/L / ALT: 16 U/L / AST: 16 U/L / GGT: x           PT/INR - ( 05 Jun 2022 07:50 )   PT: 23.4 sec;   INR: 2.00 ratio         PTT - ( 05 Jun 2022 07:50 )  PTT:35.6 sec                            9.7    9.64  )-----------( 326      ( 06 Jun 2022 06:08 )             28.9                         9.7    15.54 )-----------( 325      ( 05 Jun 2022 07:43 )             28.3                         10.7   17.77 )-----------( 333      ( 04 Jun 2022 03:11 )             30.3       Imaging:          
SURGERY CONSULT NOTE  :::::::::::::::::::::::::::::::::::::::::::::::::::::  MELI ARELLANO 71y M  MRN: [id number]  Admit Date: 22 (1d)  [facility] 8MON 809 D1  :::::::::::::::::::::::::::::::::::::::::::::::::::::  Date of Service: 22 @ 05:13  Requested by: Raj Leonard    Reason for consult: Necrotizing pancreatitis    :::::::::::::::::::::::::::::::::::::::::::::::::::::    HPI:  70 y/o M w/ pmh of GERD, Marcial's Esophagus (Last EGD in 2022 w/ no acute changes), Gout, Afibb (on Eliquis), HTN who presents w/ abd pain. Sharp, up to 8/10 pains initially diffuse, now localized upper Lt quadrant/ mid- epigastrum, worsened after eating for 4 days. 4 days ago patient noticed sharp abdominal pain within hour or two of dinner, felt like he had not had a bowel movement in a few days, and took two 5mg tablets Bisacoydl. 1hr later found with dark brown watery stools 3x, then later stools, more solid appearing though with bright red blood, noticed in toilet bowl. OF note, patient has hemorrhoids and during a flare will notice bright red blood on tissue paper only. PT had repeated episodic abdominal pain, sharp in lUQ/ mid epigastrium after meals. On 6/3 he had had chills, subjective fever of 101.2, took Tylenol and has been taking Tums for pain, went to urgent care, and was advised to come into the ED. With Esophageal history patient's last Colonoscopy was in  with no known abnormal findings and last EGD in 2021 after which he was told to come back for repeat imaging in 6mos. Patient is a former smoker of 40 pack yrs, quit 20years ago, denies recent alcohol use or other recreational drug use. He has noticed some recent symptoms of heart burn, though pain was associated with his GERD, which usually doesn't flare, and was told to increase omeprazole to 20mg BID, which he began the new bid dosing on 6/3.       In the ED patient afebrile and hemodynamically stable. Laboratory results showing  Initial imaging:     Acute pancreatitis predominantly involving the pancreatic body and tail.   Several pancreatic and peripancreatic fluid collections, as described   above for which considerations include pseudocysts, walled off necrosis,   or abscess. Pancreatic ductal dilatation in the pancreatic neck and   proximal body. Additional cystic lesions in the pancreatic head, the   larger of which measures 2 cm. Pancreas protocol MRI of the abdomen is   recommended for better characterization. Underlying malignancy is not   excluded.    Lipase: 215<H>    Allergies: Norvasc (Flushing)  simvastatin (Faint; Flushing)  Vasotec (Faint; Flushing)    Past Medical History:   Vertigo    Hypertension    Hyperlipemia    Sierra esophagus      Past Surgical History:  S/P herniorrhaphy    S/P tonsillectomy    Gallbladder calculus    History of cholecystectomy    Family History:  Denies heritable disease    Social History:   Retired   Former Tobacco user, 40pack year history, quit   No prior significant alcohol use  Ambulates independently  lives at home with wife      Home Medications:  allopurinol  bisoprolol-hydrochlorothiazide 10 mg-6.25 mg oral tablet  Cortizone-10 topical cream  Crestor 10 mg oral tablet  omeprazole 40 mg oral delayed release capsule  tamsulosin  Tums 500 mg oral tablet, chewable      ::::::::::::::::::::::::::::::::::::::::::::::::::::::::::::::::::::::::::::::::::::::::::::::::::::::::::::::::::::::::::::::::::::::::::    Vital signs:  T(C): 37.4, Max: 37.4 ( @ 22:12)  HR: 85 (74 - 89)  BP: 134/59 (129/70 - 160/85)  BP(mean): --  ABP: --  RR: 18 (16 - 18)  SpO2: 98% (97% - 98%)  Height (cm): 177.8  Weight (kg): 83.9  BMI (kg/m2): 26.5      -    --------------------------------------------------------  IN: 240 mL / OUT: 1000 mL / NET: -760 mL    Physical Exam:   General: NAD, male appearing stated age  Neuro: Awake, alert and oriented  HEENT: NC, AT, MOM  Resp: Unlabored breathing, symmetric chest expansion  GI/Abd: Soft, mildly-distended, non-tender  Musculoskeletal: All 4 extremities moving spontaneously and without deformity  Skin: Warm, dry, no rash    ::::::::::::::::::::::::::::::::::::::::::::::::::::::::::::::::::::::::::::::::::::::::::::::::::::::::::::::::::::::::::::::::::::::::::    Laboratory:                        10.7   17.77 )-----------( 333      (  @ 03:11 )             30.3                                  Phos: 1.9 M.1  124  |  85  |  16  ----------------------------<  93  3.8   |  22  |  1.01    ,                              Phos: xx Mg: xx  121  |  80  |  20  ----------------------------<  102  3.1   |  24  |  1.19       TPro 7.2 / Alb 3.9 / TBili 0.9 / DBili x  / AST/AST 13/10 / AlkPhos 104    PT/INR/PTT - ( @ 03:11) PT: 31.7 sec; INR: 2.71 ratio ; PTT: 40.7 sec       Urinalysis Basic - ( 2022 05:40 )    Color: Light Yellow / Appearance: Clear / S.029 / pH: x  Gluc: x / Ketone: Small  / Bili: Negative / Urobili: Negative   Blood: x / Protein: 30 mg/dL / Nitrite: Negative   Leuk Esterase: Negative / RBC: 1 /hpf / WBC 1 /HPF   Sq Epi: x / Non Sq Epi: 0 /hpf / Bacteria: Negative    ::::::::::::::::::::::::::::::::::::::::::::::::::::::::::::::::::::::::::::::::::::::::::::::::::::::::::::::::::::::::::::::::::::::::::    Imaging:  < from: CT Abdomen and Pelvis w/ IV Cont (22 @ 03:57) >    FINDINGS:  LOWER CHEST: Within normal limits.    LIVER: Subcentimeter hypodensities too small to characterize  BILE DUCTS: Normal caliber.  GALLBLADDER: Cholecystectomy.  SPLEEN: Within normal limits.  PANCREAS: Edematous enlargement of the pancreatic body and tail with   surrounding inflammatory changes and fluid. Several pancreatic and   peripancreatic fluid collections. Representative collection in the   pancreatic tail measures 4.5 x 3.2 x 3.8 cm, representative collection   along the inferior aspect of the pancreatic body measures 4.0 x 4.1 x 2.9   cm and representative collection in the pancreatic body measures 2.0 x   1.6 x 1.4 cm. Pancreatic duct is dilated in the pancreatic neck and   proximal body. Additional cystic lesions in the pancreatic head, the   larger of which measures 2 cm.  ADRENALS: Within normal limits.  KIDNEYS/URETERS: Kidneys enhance symmetrically without hydronephrosis.   Right lower pole indeterminate lesion measuring 1.9 x 2.0 x 2.1 cm   (602-80). Several bilateral renal cysts and subcentimeterhypodensities   too small to characterize.    BLADDER: Within normal limits.  REPRODUCTIVE ORGANS: Enlarged prostate gland with median hypertrophy.    BOWEL: No bowel obstruction. Appendix is normal. Reactive thickening of   the colon at the splenic flexure.  PERITONEUM: Trace pelvic free fluid. No pneumoperitoneum. No mesenteric   lymphadenopathy.  VESSELS: Atherosclerotic calcifications of the aortoiliac tree. Normal   caliber abdominal aorta. Portal vein and splenic vein are patent.  RETROPERITONEUM/LYMPH NODES: No lymphadenopathy.  ABDOMINAL WALL: Small fat-containing umbilical and right inguinal hernia.  BONES: Degenerative change of the spine.    IMPRESSION:  Acute pancreatitis predominantly involving the pancreatic body and tail.   Several pancreatic and peripancreatic fluid collections, as described   above for which considerations include pseudocysts, walled off necrosis,   or abscess. Pancreatic ductal dilatation in the pancreatic neck and   proximal body. Additional cystic lesions in the pancreatic head, the   larger of which measures 2 cm. Pancreas protocol MRI of the abdomen is   recommended for better characterization. Underlying malignancy is not   excluded.    Indeterminate cystic lesion in the right kidney measuring up to 2.1 cm   which can also be characterized on MRI.    < end of copied text >      ::::::::::::::::::::::::::::::::::::::::::::::::::::::::::::::::::::::::::::::::::::::::::::::::::::::::::::::::::::::::::::::::::::::::::
Radiologist

## 2022-06-06 NOTE — PROGRESS NOTE ADULT - PROBLEM SELECTOR PLAN 1
Acute pancreatitis, unknown etiology, found on CT at pancreatic body and tail, pancreatic ductal dilatation. No pleural effusions  Possibly 2/2 chronic process given various fluid collections; drug induced (omeprazole, HCTZ, Crestor home meds) versus GI infection (Colonic thickening on CT)  Lipase 212  Ca 9.7  TG wnl  - MRCP  - c/w IVF hydration: LR 100cc/hr 12 hours  - f/u BCx  - f/u GI reccs, possible EUS?   - clear liquid diet, advance as tolerated  - VS Q4hrs

## 2022-06-06 NOTE — CONSULT NOTE ADULT - ATTENDING COMMENTS
As above  71M with acute pancreatitis with acute blaire-pancreatic collections and dilated PD  No clear etiology of pancreatitis  Check IgG4 though low suspicion for AIP  No need to check CA 19-9 now as likely will be elevated with acute panc  Clinically improving  Continue medical management with hydration, IVF, low fat diet  No endoscopic intervention planned during acute flare    Patient needs to follow up as an outpatient with our advanced team ([phone]) in approx 4-6 weeks with repeat imaging to eval for resolution of collections, re-eval parenchyma and duct when pancreas no longer inflamed.  Likely will plan for outpatient EUS as well but TBD after office visit    Thank you for this interesting consult.  Please call the advanced GI service with any questions or concerns.

## 2022-06-06 NOTE — PROGRESS NOTE ADULT - PROBLEM SELECTOR PLAN 3
Takes omeprazole 40mg, recently increased to bid 6/3  - STOP IV pantoprazole 40mg BID  - Start maalox Q4hrs for Dyspepsia, sucralfate 1g BID  - consider starting Famotidine IV BID

## 2022-06-06 NOTE — CONSULT NOTE ADULT - ATTENDING SUPERVISION STATEMENT
Augustus Do Patient Age: 10 year old  MESSAGE:   Pts mother calling stating that they have been exposed to someone who just tested positive for covid thus Thursday but they were around this person last week    Mother would like to know if pt is able to get a rapid test done    Please advise    Message confirmed with caller.    Call connected to [facility] Triage queue.  Routed to Provider's clinical pool.     WEIGHT AND HEIGHT:   Wt Readings from Last 1 Encounters:   08/31/20 43.8 kg (96 lb 9.6 oz) (78 %, Z= 0.76)*     * Growth percentiles are based on CDC (Boys, 2-20 Years) data.     Ht Readings from Last 1 Encounters:   08/31/20 5' 1.02\" (1.55 m) (91 %, Z= 1.33)*     * Growth percentiles are based on CDC (Boys, 2-20 Years) data.     BMI Readings from Last 1 Encounters:   08/31/20 18.24 kg/m² (64 %, Z= 0.35)*     * Growth percentiles are based on CDC (Boys, 2-20 Years) data.       ALLERGIES:  Patient has no known allergies.  Current Outpatient Medications   Medication   • methylpheniDATE (RITALIN) 10 MG tablet     No current facility-administered medications for this visit.      PHARMACY to use:           Pharmacy preference(s) on file:   [id number] - [place], [place] - [address]  [address] [address]  Phone: [phone] Fax: [phone]      CALL BACK INFO: Ok to leave response (including medical information) on answering machine  ROUTING: Patient's physician/staff        PCP: Sofia Arzola MD         INS: Payor: [employer]UE [employer]UE [employer]ELD [place] / Plan: PPO [id number] / Product Type: PPO MISC   PATIENT ADDRESS:  [address] [id number]     Fellow

## 2022-06-06 NOTE — PROGRESS NOTE ADULT - PROBLEM SELECTOR PLAN 5
home regimen: Bisoprolol 10mg/HCTZ 6.25mg  - hold HCTZ i/s/o hyponatremia  - c/w Bisoprolol 10mg daily

## 2022-06-06 NOTE — PROGRESS NOTE ADULT - PROBLEM SELECTOR PLAN 4
Hypo-osmolar Hyponatremia likely 2/2 SAIDH due to pancreatic abscesses, peripancreatic, peritoneal fluid spacing  Na 120, Serum 249, Uosm 275  s/p 500cc bolus in the --> 121, likely SIADH and self- correcting with fluids    - c/w management for pancreatitis  - LR 100cc/hr for pancreatitis   - f/u BMP 4-6hrs today after isotonic fluids, if Na decreasing ok to switch to NS  - hold loop diuretics, HCTZ

## 2022-06-06 NOTE — PROGRESS NOTE ADULT - PROBLEM SELECTOR PLAN 8
Indeterminate cystic lesion in the right kidney measuring up to 2.1 cm which can also be characterized on MRI.  Plan:   - f/u w/ outpatient PCP for routine imaging

## 2022-06-06 NOTE — CONSULT NOTE ADULT - ASSESSMENT
71M significant smoking Hx x 40 years (quit 20 years ago), GERD, Sierra's Esophagus (Last EGD in 4/2022 w/ no acute changes), Gout, Afib (on Eliquis), HTN presenting with abd pain, fevers, found to have acute pancreatitis now w/ collections + PD dilation for which adv GI is consulted.       Impression:   #Acute pancreatitis w/ development of pancreatic collections: pw abd pain, fevers w/ hypoNa. Found to have acute pancreatitis (unclear etiology  of pancreatitis; s/p cholecystectomy w/ normal bile ducts, normal trig, denies significant ETOH use, ?drug induced, vs will eventually r/o malignancy when acute pancreatitis resolves).  Clinically improving in terms of abd pain, tolerating clears, afebrile, HD stable.   Imaging: CT a/p showed "acute pancreatitis predominantly involving the pancreatic body and tail. Several pancreatic and peripancreatic fluid collections, as described above for which considerations include pseudocysts, walled off necrosis, or abscess. Pancreatic ductal dilatation in the pancreatic neck and proximal body. Additional cystic lesions in the pancreatic head, the larger of which measures 2 cm. Pancreas protocol MRI of the abdomen is recommended for better characterization. Underlying malignancy is not excluded."  --> likely pancreatic collections in setting of acute inflammation       Recommendations:   - send IgG4, Ca 19-9  - Adv diet as tolerated  - IVF + pain control per primary team for tx acute pancreatitis   - Would evaluate pancreas with EUS in 4-6 weeks (can evaluate for pancreatic malignancy at that time)  - Would repeat CT a/p IV contrast (pancreatic protocol) prior to EUS  - Outpatient follow up visit (will email) with Dr. Quintanilla for ^^      Thank you for involving us in the care of this patient, please reach out if any further questions.     Sunil Lopez MD  Gastroenterology/Hepatology Fellow, PGY5    Available on Microsoft Teams  [phone] ([facility])  [phone] ([facility])  Please contact on call fellow weekdays after 5pm-7am and weekends: [phone]         71M significant smoking Hx x 40 years (quit 20 years ago), GERD, Sierra's Esophagus (Last EGD in 4/2022 w/ no acute changes), Gout, Afib (on Eliquis), HTN presenting with abd pain, fevers, found to have acute pancreatitis now w/ collections + PD dilation for which adv GI is consulted.       Impression:   #Acute pancreatitis w/ development of pancreatic collections: pw abd pain, fevers w/ hypoNa. Found to have acute pancreatitis (unclear etiology  of pancreatitis; s/p cholecystectomy w/ normal bile ducts, normal trig, denies significant ETOH use, ?drug induced, vs will eventually r/o malignancy when acute pancreatitis resolves).  Clinically improving in terms of abd pain, tolerating clears, afebrile, HD stable.   Imaging: CT a/p showed "acute pancreatitis predominantly involving the pancreatic body and tail. Several pancreatic and peripancreatic fluid collections, as described above for which considerations include pseudocysts, walled off necrosis, or abscess. Pancreatic ductal dilatation in the pancreatic neck and proximal body. Additional cystic lesions in the pancreatic head, the larger of which measures 2 cm. Pancreas protocol MRI of the abdomen is recommended for better characterization. Underlying malignancy is not excluded."  --> likely pancreatic collections in setting of acute inflammation       Recommendations:   - send IgG4, TG  - Adv diet as tolerated  - IVF + pain control per primary team for tx acute pancreatitis   - Would evaluate pancreas with EUS in 4-6 weeks (can evaluate for pancreatic malignancy at that time)  - Would repeat CT a/p IV contrast (pancreatic protocol) prior to EUS  - Outpatient follow up visit (will email) with Dr. Quintanilla for ^^      Thank you for involving us in the care of this patient, please reach out if any further questions.     Sunil Lopez MD  Gastroenterology/Hepatology Fellow, PGY5    Available on [employer] Teams  [phone] ([facility])  [phone] ([facility])  Please contact on call fellow weekdays after 5pm-7am and weekends: [phone]

## 2022-06-06 NOTE — PROGRESS NOTE ADULT - SUBJECTIVE AND OBJECTIVE BOX
Anila Ruff MD  Internal Medicine PGY1  [facility]: [id number]/ NS: [phone]    DATE OF SERVICE: 06-06-22 @ 07:32    Patient is a 71y old  Male who presents with a chief complaint of Abdominal pain (05 Jun 2022 07:21)      SUBJECTIVE / OVERNIGHT EVENTS:  no acute events overnight.       MEDICATIONS  (STANDING):  allopurinol 200 milliGRAM(s) Oral daily  aluminum hydroxide/magnesium hydroxide/simethicone Suspension 30 milliLiter(s) Oral every 4 hours  artificial tears (preservative free) Ophthalmic Solution 1 Drop(s) Both EYES two times a day  bisoprolol   Tablet 10 milliGRAM(s) Oral daily  famotidine Injectable 20 milliGRAM(s) IV Push every 12 hours  lactated ringers. 1000 milliLiter(s) (100 mL/Hr) IV Continuous <Continuous>  meropenem  IVPB 1000 milliGRAM(s) IV Intermittent every 8 hours  rosuvastatin 10 milliGRAM(s) Oral at bedtime  sucralfate suspension 1 Gram(s) Oral two times a day  tamsulosin 0.8 milliGRAM(s) Oral at bedtime    MEDICATIONS  (PRN):  acetaminophen     Tablet .. 650 milliGRAM(s) Oral every 6 hours PRN Temp greater or equal to 38C (100.4F), Mild Pain (1 - 3)  hydrocortisone 2.5% Rectal Cream 1 Application(s) Rectal three times a day PRN hemorrhoids  melatonin 3 milliGRAM(s) Oral at bedtime PRN Insomnia  ondansetron Injectable 4 milliGRAM(s) IV Push every 8 hours PRN Nausea and/or Vomiting      Vital Signs Last 24 Hrs  T(C): 36.9 (05 Jun 2022 23:51), Max: 37.1 (05 Jun 2022 08:07)  T(F): 98.4 (05 Jun 2022 23:51), Max: 98.7 (05 Jun 2022 08:07)  HR: 80 (06 Jun 2022 05:36) (74 - 83)  BP: 150/68 (06 Jun 2022 05:36) (133/62 - 150/68)  BP(mean): --  RR: 18 (05 Jun 2022 23:51) (18 - 18)  SpO2: 97% (05 Jun 2022 23:51) (97% - 99%)  CAPILLARY BLOOD GLUCOSE        I&O's Summary    05 Jun 2022 07:01  -  06 Jun 2022 07:00  --------------------------------------------------------  IN: 420 mL / OUT: 0 mL / NET: 420 mL        PHYSICAL EXAM:  GENERAL: NAD, well-developed  HEAD:  Atraumatic, Normocephalic  EYES: EOMI, PERRLA, conjunctiva and sclera clear  NECK: Supple, No JVD  CHEST/LUNG: Clear to auscultation bilaterally; No wheeze  HEART: Regular rate and rhythm; No murmurs, rubs, or gallops  ABDOMEN: Soft, Nontender, Nondistended; Bowel sounds present  EXTREMITIES:  2+ Peripheral Pulses, No clubbing, cyanosis, or edema  PSYCH: AAOx3  NEUROLOGY: non-focal  SKIN: No rashes or lesions    LABS:                        9.7    9.64  )-----------( 326      ( 06 Jun 2022 06:08 )             28.9     06-06    133<L>  |  94<L>  |  17  ----------------------------<  116<H>  3.9   |  26  |  1.03    Ca    10.1      06 Jun 2022 06:08  Phos  2.7     06-06  Mg     1.6     06-06    TPro  6.2  /  Alb  3.4  /  TBili  0.4  /  DBili  x   /  AST  16  /  ALT  16  /  AlkPhos  102  06-06    PT/INR - ( 05 Jun 2022 07:50 )   PT: 23.4 sec;   INR: 2.00 ratio         PTT - ( 05 Jun 2022 07:50 )  PTT:35.6 sec          RADIOLOGY & ADDITIONAL TESTS:    Imaging Personally Reviewed:    Consultant(s) Notes Reviewed:      Care Discussed with Consultants/Other Providers:   Anila Ruff MD  Internal Medicine PGY1  [facility]: [id number]/ NS: [phone]    DATE OF SERVICE: 06-06-22 @ 07:32    Patient is a 71y old  Male who presents with a chief complaint of Abdominal pain (05 Jun 2022 07:21)      SUBJECTIVE / OVERNIGHT EVENTS:  no acute events overnight.   Pt reports three episodes diarrhea, blood streaked likely 2/2 hemorrhoids.   Mild abdominal pain w/ eating.       MEDICATIONS  (STANDING):  allopurinol 200 milliGRAM(s) Oral daily  aluminum hydroxide/magnesium hydroxide/simethicone Suspension 30 milliLiter(s) Oral every 4 hours  artificial tears (preservative free) Ophthalmic Solution 1 Drop(s) Both EYES two times a day  bisoprolol   Tablet 10 milliGRAM(s) Oral daily  famotidine Injectable 20 milliGRAM(s) IV Push every 12 hours  lactated ringers. 1000 milliLiter(s) (100 mL/Hr) IV Continuous <Continuous>  meropenem  IVPB 1000 milliGRAM(s) IV Intermittent every 8 hours  rosuvastatin 10 milliGRAM(s) Oral at bedtime  sucralfate suspension 1 Gram(s) Oral two times a day  tamsulosin 0.8 milliGRAM(s) Oral at bedtime    MEDICATIONS  (PRN):  acetaminophen     Tablet .. 650 milliGRAM(s) Oral every 6 hours PRN Temp greater or equal to 38C (100.4F), Mild Pain (1 - 3)  hydrocortisone 2.5% Rectal Cream 1 Application(s) Rectal three times a day PRN hemorrhoids  melatonin 3 milliGRAM(s) Oral at bedtime PRN Insomnia  ondansetron Injectable 4 milliGRAM(s) IV Push every 8 hours PRN Nausea and/or Vomiting      Vital Signs Last 24 Hrs  T(C): 36.9 (05 Jun 2022 23:51), Max: 37.1 (05 Jun 2022 08:07)  T(F): 98.4 (05 Jun 2022 23:51), Max: 98.7 (05 Jun 2022 08:07)  HR: 80 (06 Jun 2022 05:36) (74 - 83)  BP: 150/68 (06 Jun 2022 05:36) (133/62 - 150/68)  BP(mean): --  RR: 18 (05 Jun 2022 23:51) (18 - 18)  SpO2: 97% (05 Jun 2022 23:51) (97% - 99%)  CAPILLARY BLOOD GLUCOSE        I&O's Summary    05 Jun 2022 07:01  -  06 Jun 2022 07:00  --------------------------------------------------------  IN: 420 mL / OUT: 0 mL / NET: 420 mL        PHYSICAL EXAM:  GENERAL: NAD, well-developed  HEAD:  Atraumatic, Normocephalic  EYES: EOMI, PERRLA, conjunctiva and sclera clear  NECK: Supple, No JVD  CHEST/LUNG: Clear to auscultation bilaterally; No wheeze  HEART: Regular rate and rhythm; No murmurs, rubs, or gallops  ABDOMEN: Soft, Nontender, Nondistended; Bowel sounds present  EXTREMITIES:  2+ Peripheral Pulses, No clubbing, cyanosis, or edema  PSYCH: AAOx3  NEUROLOGY: non-focal  SKIN: No rashes or lesions    LABS:                        9.7    9.64  )-----------( 326      ( 06 Jun 2022 06:08 )             28.9     06-06    133<L>  |  94<L>  |  17  ----------------------------<  116<H>  3.9   |  26  |  1.03    Ca    10.1      06 Jun 2022 06:08  Phos  2.7     06-06  Mg     1.6     06-06    TPro  6.2  /  Alb  3.4  /  TBili  0.4  /  DBili  x   /  AST  16  /  ALT  16  /  AlkPhos  102  06-06    PT/INR - ( 05 Jun 2022 07:50 )   PT: 23.4 sec;   INR: 2.00 ratio         PTT - ( 05 Jun 2022 07:50 )  PTT:35.6 sec          RADIOLOGY & ADDITIONAL TESTS:    Imaging Personally Reviewed:    Consultant(s) Notes Reviewed:      Care Discussed with Consultants/Other Providers:

## 2022-06-07 ENCOUNTER — TRANSCRIPTION ENCOUNTER (OUTPATIENT)
Age: 72
End: 2022-06-07

## 2022-06-07 VITALS
TEMPERATURE: 98 F | DIASTOLIC BLOOD PRESSURE: 75 MMHG | SYSTOLIC BLOOD PRESSURE: 153 MMHG | OXYGEN SATURATION: 98 % | RESPIRATION RATE: 18 BRPM | HEART RATE: 67 BPM

## 2022-06-07 LAB
ALBUMIN SERPL ELPH-MCNC: 3.3 G/DL — SIGNIFICANT CHANGE UP (ref 3.3–5)
ALP SERPL-CCNC: 97 U/L — SIGNIFICANT CHANGE UP (ref 40–120)
ALT FLD-CCNC: 18 U/L — SIGNIFICANT CHANGE UP (ref 10–45)
ANION GAP SERPL CALC-SCNC: 15 MMOL/L — SIGNIFICANT CHANGE UP (ref 5–17)
AST SERPL-CCNC: 16 U/L — SIGNIFICANT CHANGE UP (ref 10–40)
BASOPHILS # BLD AUTO: 0.05 K/UL — SIGNIFICANT CHANGE UP (ref 0–0.2)
BASOPHILS NFR BLD AUTO: 0.7 % — SIGNIFICANT CHANGE UP (ref 0–2)
BILIRUB SERPL-MCNC: 0.4 MG/DL — SIGNIFICANT CHANGE UP (ref 0.2–1.2)
BUN SERPL-MCNC: 13 MG/DL — SIGNIFICANT CHANGE UP (ref 7–23)
CALCIUM SERPL-MCNC: 10.1 MG/DL — SIGNIFICANT CHANGE UP (ref 8.4–10.5)
CANCER AG125 SERPL-ACNC: 61 U/ML — HIGH
CANCER AG19-9 SERPL-ACNC: <2 U/ML — SIGNIFICANT CHANGE UP
CEA SERPL-MCNC: 1 NG/ML — SIGNIFICANT CHANGE UP (ref 0–3.8)
CHLORIDE SERPL-SCNC: 96 MMOL/L — SIGNIFICANT CHANGE UP (ref 96–108)
CO2 SERPL-SCNC: 26 MMOL/L — SIGNIFICANT CHANGE UP (ref 22–31)
CREAT SERPL-MCNC: 0.95 MG/DL — SIGNIFICANT CHANGE UP (ref 0.5–1.3)
EGFR: 85 ML/MIN/1.73M2 — SIGNIFICANT CHANGE UP
EOSINOPHIL # BLD AUTO: 0.08 K/UL — SIGNIFICANT CHANGE UP (ref 0–0.5)
EOSINOPHIL NFR BLD AUTO: 1 % — SIGNIFICANT CHANGE UP (ref 0–6)
GLUCOSE SERPL-MCNC: 118 MG/DL — HIGH (ref 70–99)
HCT VFR BLD CALC: 28.5 % — LOW (ref 39–50)
HCV AB S/CO SERPL IA: 0.1 S/CO — SIGNIFICANT CHANGE UP (ref 0–0.99)
HCV AB SERPL-IMP: SIGNIFICANT CHANGE UP
HGB BLD-MCNC: 9.4 G/DL — LOW (ref 13–17)
IMM GRANULOCYTES NFR BLD AUTO: 0.5 % — SIGNIFICANT CHANGE UP (ref 0–1.5)
LYMPHOCYTES # BLD AUTO: 0.84 K/UL — LOW (ref 1–3.3)
LYMPHOCYTES # BLD AUTO: 11 % — LOW (ref 13–44)
MAGNESIUM SERPL-MCNC: 1.6 MG/DL — SIGNIFICANT CHANGE UP (ref 1.6–2.6)
MCHC RBC-ENTMCNC: 28.1 PG — SIGNIFICANT CHANGE UP (ref 27–34)
MCHC RBC-ENTMCNC: 33 GM/DL — SIGNIFICANT CHANGE UP (ref 32–36)
MCV RBC AUTO: 85.3 FL — SIGNIFICANT CHANGE UP (ref 80–100)
MONOCYTES # BLD AUTO: 0.64 K/UL — SIGNIFICANT CHANGE UP (ref 0–0.9)
MONOCYTES NFR BLD AUTO: 8.4 % — SIGNIFICANT CHANGE UP (ref 2–14)
NEUTROPHILS # BLD AUTO: 5.98 K/UL — SIGNIFICANT CHANGE UP (ref 1.8–7.4)
NEUTROPHILS NFR BLD AUTO: 78.4 % — HIGH (ref 43–77)
NRBC # BLD: 0 /100 WBCS — SIGNIFICANT CHANGE UP (ref 0–0)
PHOSPHATE SERPL-MCNC: 2.7 MG/DL — SIGNIFICANT CHANGE UP (ref 2.5–4.5)
PLATELET # BLD AUTO: 341 K/UL — SIGNIFICANT CHANGE UP (ref 150–400)
POTASSIUM SERPL-MCNC: 3.8 MMOL/L — SIGNIFICANT CHANGE UP (ref 3.5–5.3)
POTASSIUM SERPL-SCNC: 3.8 MMOL/L — SIGNIFICANT CHANGE UP (ref 3.5–5.3)
PROT SERPL-MCNC: 6.1 G/DL — SIGNIFICANT CHANGE UP (ref 6–8.3)
RBC # BLD: 3.34 M/UL — LOW (ref 4.2–5.8)
RBC # FLD: 13.4 % — SIGNIFICANT CHANGE UP (ref 10.3–14.5)
SODIUM SERPL-SCNC: 137 MMOL/L — SIGNIFICANT CHANGE UP (ref 135–145)
WBC # BLD: 7.63 K/UL — SIGNIFICANT CHANGE UP (ref 3.8–10.5)
WBC # FLD AUTO: 7.63 K/UL — SIGNIFICANT CHANGE UP (ref 3.8–10.5)

## 2022-06-07 PROCEDURE — 84295 ASSAY OF SERUM SODIUM: CPT

## 2022-06-07 PROCEDURE — 84156 ASSAY OF PROTEIN URINE: CPT

## 2022-06-07 PROCEDURE — 83036 HEMOGLOBIN GLYCOSYLATED A1C: CPT

## 2022-06-07 PROCEDURE — 83935 ASSAY OF URINE OSMOLALITY: CPT

## 2022-06-07 PROCEDURE — 85730 THROMBOPLASTIN TIME PARTIAL: CPT

## 2022-06-07 PROCEDURE — 80061 LIPID PANEL: CPT

## 2022-06-07 PROCEDURE — 83605 ASSAY OF LACTIC ACID: CPT

## 2022-06-07 PROCEDURE — 82787 IGG 1 2 3 OR 4 EACH: CPT

## 2022-06-07 PROCEDURE — 99239 HOSP IP/OBS DSCHRG MGMT >30: CPT

## 2022-06-07 PROCEDURE — 86901 BLOOD TYPING SEROLOGIC RH(D): CPT

## 2022-06-07 PROCEDURE — 87086 URINE CULTURE/COLONY COUNT: CPT

## 2022-06-07 PROCEDURE — A9585: CPT

## 2022-06-07 PROCEDURE — 82435 ASSAY OF BLOOD CHLORIDE: CPT

## 2022-06-07 PROCEDURE — 82330 ASSAY OF CALCIUM: CPT

## 2022-06-07 PROCEDURE — 99285 EMERGENCY DEPT VISIT HI MDM: CPT | Mod: 25

## 2022-06-07 PROCEDURE — 82803 BLOOD GASES ANY COMBINATION: CPT

## 2022-06-07 PROCEDURE — 82565 ASSAY OF CREATININE: CPT

## 2022-06-07 PROCEDURE — 85018 HEMOGLOBIN: CPT

## 2022-06-07 PROCEDURE — 80048 BASIC METABOLIC PNL TOTAL CA: CPT

## 2022-06-07 PROCEDURE — 87040 BLOOD CULTURE FOR BACTERIA: CPT

## 2022-06-07 PROCEDURE — 82570 ASSAY OF URINE CREATININE: CPT

## 2022-06-07 PROCEDURE — 81001 URINALYSIS AUTO W/SCOPE: CPT

## 2022-06-07 PROCEDURE — 82947 ASSAY GLUCOSE BLOOD QUANT: CPT

## 2022-06-07 PROCEDURE — 0225U NFCT DS DNA&RNA 21 SARSCOV2: CPT

## 2022-06-07 PROCEDURE — 36415 COLL VENOUS BLD VENIPUNCTURE: CPT

## 2022-06-07 PROCEDURE — 85027 COMPLETE CBC AUTOMATED: CPT

## 2022-06-07 PROCEDURE — 80053 COMPREHEN METABOLIC PANEL: CPT

## 2022-06-07 PROCEDURE — 84478 ASSAY OF TRIGLYCERIDES: CPT

## 2022-06-07 PROCEDURE — 86803 HEPATITIS C AB TEST: CPT

## 2022-06-07 PROCEDURE — 84100 ASSAY OF PHOSPHORUS: CPT

## 2022-06-07 PROCEDURE — 86900 BLOOD TYPING SEROLOGIC ABO: CPT

## 2022-06-07 PROCEDURE — 82378 CARCINOEMBRYONIC ANTIGEN: CPT

## 2022-06-07 PROCEDURE — 86850 RBC ANTIBODY SCREEN: CPT

## 2022-06-07 PROCEDURE — 84300 ASSAY OF URINE SODIUM: CPT

## 2022-06-07 PROCEDURE — 84132 ASSAY OF SERUM POTASSIUM: CPT

## 2022-06-07 PROCEDURE — 74183 MRI ABD W/O CNTR FLWD CNTR: CPT

## 2022-06-07 PROCEDURE — 93005 ELECTROCARDIOGRAM TRACING: CPT

## 2022-06-07 PROCEDURE — 86301 IMMUNOASSAY TUMOR CA 19-9: CPT

## 2022-06-07 PROCEDURE — 85025 COMPLETE CBC W/AUTO DIFF WBC: CPT

## 2022-06-07 PROCEDURE — 85014 HEMATOCRIT: CPT

## 2022-06-07 PROCEDURE — 74177 CT ABD & PELVIS W/CONTRAST: CPT | Mod: MA

## 2022-06-07 PROCEDURE — 85610 PROTHROMBIN TIME: CPT

## 2022-06-07 PROCEDURE — 83690 ASSAY OF LIPASE: CPT

## 2022-06-07 PROCEDURE — 96374 THER/PROPH/DIAG INJ IV PUSH: CPT

## 2022-06-07 PROCEDURE — 71045 X-RAY EXAM CHEST 1 VIEW: CPT

## 2022-06-07 PROCEDURE — 84550 ASSAY OF BLOOD/URIC ACID: CPT

## 2022-06-07 PROCEDURE — 83735 ASSAY OF MAGNESIUM: CPT

## 2022-06-07 PROCEDURE — 83930 ASSAY OF BLOOD OSMOLALITY: CPT

## 2022-06-07 PROCEDURE — 86304 IMMUNOASSAY TUMOR CA 125: CPT

## 2022-06-07 PROCEDURE — 99233 SBSQ HOSP IP/OBS HIGH 50: CPT | Mod: GC

## 2022-06-07 RX ORDER — FAMOTIDINE 10 MG/ML
1 INJECTION INTRAVENOUS
Qty: 60 | Refills: 0
Start: 2022-06-07 | End: 2022-07-06

## 2022-06-07 RX ORDER — OMEPRAZOLE 10 MG/1
40 CAPSULE, DELAYED RELEASE ORAL
Qty: 0 | Refills: 0 | DISCHARGE

## 2022-06-07 RX ORDER — BISOPROLOL FUMARATE 10 MG/1
1 TABLET, FILM COATED ORAL
Qty: 30 | Refills: 0
Start: 2022-06-07 | End: 2022-07-06

## 2022-06-07 RX ORDER — BISOPROLOL FUMARATE AND HYDROCHLOROTHIAZIDE 5; 6.25 MG/1; MG/1
1 TABLET ORAL
Qty: 0 | Refills: 0 | DISCHARGE

## 2022-06-07 RX ADMIN — FAMOTIDINE 20 MILLIGRAM(S): 10 INJECTION INTRAVENOUS at 06:08

## 2022-06-07 RX ADMIN — BISOPROLOL FUMARATE 10 MILLIGRAM(S): 10 TABLET, FILM COATED ORAL at 05:59

## 2022-06-07 RX ADMIN — Medication 1 GRAM(S): at 05:58

## 2022-06-07 RX ADMIN — Medication 1 DROP(S): at 06:00

## 2022-06-07 RX ADMIN — Medication 30 MILLILITER(S): at 05:58

## 2022-06-07 NOTE — DISCHARGE NOTE NURSING/CASE MANAGEMENT/SOCIAL WORK - PATIENT PORTAL LINK FT
You can access the FollowMyHealth Patient Portal offered by [facility] by registering at the following website: [email or web address]. By joining [facility]’s FollowMyHealth portal, you will also be able to view your health information using other applications (apps) compatible with our system.

## 2022-06-07 NOTE — DISCHARGE NOTE PROVIDER - NSDCFUADDAPPT_GEN_ALL_CORE_FT
Please see the Advanced GI Physician, Dr. Cruz for follow- up appointment for further imaging and management.

## 2022-06-07 NOTE — PROGRESS NOTE ADULT - PROBLEM SELECTOR PLAN 4
Resolved. Hypo-osmolar Hyponatremia likely 2/2 SAIDH due to pancreatic abscesses, peripancreatic, peritoneal fluid spacing  Na 120, Serum 249, Uosm 275  s/p 500cc bolus in the --> 121, likely SIADH and self- correcting with fluids    - hold loop diuretics, HCTZ

## 2022-06-07 NOTE — DISCHARGE NOTE PROVIDER - CARE PROVIDER_API CALL
Merari Cruz)  Gastroenterology; Internal Medicine  [address] [place] [address], [facility] [address]  [place], [place] [id number]  Phone: [phone]  Fax: [phone]  Established Patient  Follow Up Time: 2 weeks

## 2022-06-07 NOTE — DISCHARGE NOTE PROVIDER - NSDCCPTREATMENT_GEN_ALL_CORE_FT
PRINCIPAL PROCEDURE  Procedure: Magnetic resonance cholangiopancreatography (MRCP)  Findings and Treatment:        PRINCIPAL PROCEDURE  Procedure: Magnetic resonance cholangiopancreatography (MRCP)  Findings and Treatment: SPLEEN: Top normal. Trace perisplenic fluid. Peripancreatic inflammation extends to the splenic hilus  PANCREAS: Multiple complex macrocystic lesions with internal nodular enhancement suspicious for cystic neoplasms as follows:  Pancreatic head 5 cm lesion which suggests ductal communication with a solid internal enhancing at least 1.5 cm nodule (7, 1 and 5, 32)  Pancreatic tail approximately 8 x 7 x 3 cm lesion with irregular internal septal and nodular enhancement (13, 36 and 1201, 40). Surrounding peripancreatic inflammation  Adjacent distal pancreatic body 5 x 4 x 3 cm lesion with irregular septal and nodular enhancement (13, 39 and 1201, 47). Surrounding peripancreatic inflammation.  No main duct dilation. Couple additional smaller cystic pancreatic lesions for example measuring 2 cm of the neck.  Decreased pancreatic T1 bright signal and decreased enhancement involving the distal body and tail  ADRENALS: Within normal limits.  KIDNEYS/URETERS: Symmetric renal enhancement without hydronephrosis. Small areas of cortical scarring bilaterally Small bilateral simple cysts. Small hemorrhagic cysts within the right mid kidney and right lower pole.  VISUALIZED PORTIONS:  BOWEL: Gastric wall thickening with extension of peripancreatic inflammation to the greater curvature.  Colonic splenic flexure wall thickening likely secondary to extension of the pancreatic inflammation  Unobstructed bowel  PERITONEUM: Trace fluid along the left paracolic gutter  VESSELS: Atherosclerotic changes.  RETROPERITONEUM/LYMPH NODES: No lymphadenopathy.  ABDOMINAL WALL: Small fat-containing umbilical hernia.  BONES: Multilevel degenerative changes throughout the spine.  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 Distal pancreatic body and tail changes which may represent

## 2022-06-07 NOTE — DISCHARGE NOTE PROVIDER - HOSPITAL COURSE
HPI:   70 y/o M w/ pmh of GERD, Marcial's Esophagus (Last EGD in 4/2022 w/ no acute changes), Gout, Afibb (on Eliquis), HTN who presents w/ abd pain. Sharp, up to 8/10 pains initially diffuse, now localized upper Lt quadrant/ mid- epigastrium, worsened after eating for 4 days. 4 days ago patient noticed sharp abdominal pain within hour or two of dinner, felt like he had not had a bowel movement in a few days, and took two 5mg tablets Bisacoydl. 1hr later found with dark brown watery stools 3x, then later stools, more solid appearing though with bright red blood, noticed in toilet bowl. OF note, patient has hemorrhoids and during a flare will notice bright red blood on tissue paper only. PT had repeated episodic abdominal pain, sharp in lUQ/ mid epigastrium after meals. On 6/3 he had had chills, subjective fever of 101.2, took Tylenol and has been taking Tums for pain, went to urgent care, and was advised to come into the ED.  Patient is a former smoker of 40 pack yrs, quit 20years ago, denies recent alcohol use or other recreational drug use. He has noticed some recent symptoms of heart burn, though pain was associated with his GERD, which usually doesn't flare, and was told to increase omeprazole to 20mg BID, which he began the new bid dosing on 6/3. PT denies nausea, vomiting, fevers/ chills at present. Ambulates independently at home.    Hospital Course:   Patient was found to have acute pancreatitis, pancreatic head mass, fluid filled pancreatic lesions along the body and tail on CT abdomen, and hypo- osmolar hyponatremia secondary to volume depletion. The patient's symptoms were controlled with IV fluids, and his hyponatremia was managed on fluids and self- corrected. The patient was started on short course of IV antibiotics, pending MRI abdomen results. 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 Antibiotics were discontinued. Diet was advanced, fluids discontinued, as patient tolerated diet without requiring IV pain medications. Patient remained clinically stable and ready for discharge after given information for appropriate Advanced GI clinics.

## 2022-06-07 NOTE — DISCHARGE NOTE PROVIDER - NSDCMRMEDTOKEN_GEN_ALL_CORE_FT
allopurinol: 200 milligram(s) orally once a day  bisoprolol 10 mg oral tablet: 1 tab(s) orally once a day  Cortizone-10 topical cream: Apply topically to affected area 3 times a day, As Needed  Crestor 10 mg oral tablet: 1 tab(s) orally once a day  famotidine 10 mg oral tablet: 1 tab(s) orally 2 times a day   tamsulosin: 0.8 milligram(s) orally once a day  Tums 500 mg oral tablet, chewable: 2 tab(s) orally once a day (with meals), As needed

## 2022-06-07 NOTE — DISCHARGE NOTE NURSING/CASE MANAGEMENT/SOCIAL WORK - NSDCPEFALRISK_GEN_ALL_CORE
For information on Fall & Injury Prevention, visit: [email or web address] OR  [email or web address] OR  https://www.cdc.gov/steadi/patient.html

## 2022-06-07 NOTE — PROGRESS NOTE ADULT - SUBJECTIVE AND OBJECTIVE BOX
Anila Ruff MD  Internal Medicine PGY1  [facility]: [id number]/ NS: [phone]    DATE OF SERVICE: 06-07-22 @ 07:47    Patient is a 71y old  Male who presents with a chief complaint of Abdominal pain (06 Jun 2022 12:49)      SUBJECTIVE / OVERNIGHT EVENTS:    MEDICATIONS  (STANDING):  allopurinol 200 milliGRAM(s) Oral daily  aluminum hydroxide/magnesium hydroxide/simethicone Suspension 30 milliLiter(s) Oral every 4 hours  artificial tears (preservative free) Ophthalmic Solution 1 Drop(s) Both EYES two times a day  bisoprolol   Tablet 10 milliGRAM(s) Oral daily  famotidine Injectable 20 milliGRAM(s) IV Push every 12 hours  lactated ringers. 1000 milliLiter(s) (100 mL/Hr) IV Continuous <Continuous>  rosuvastatin 10 milliGRAM(s) Oral at bedtime  sucralfate suspension 1 Gram(s) Oral two times a day  tamsulosin 0.8 milliGRAM(s) Oral at bedtime    MEDICATIONS  (PRN):  acetaminophen     Tablet .. 650 milliGRAM(s) Oral every 6 hours PRN Temp greater or equal to 38C (100.4F), Mild Pain (1 - 3)  hydrocortisone 2.5% Rectal Cream 1 Application(s) Rectal three times a day PRN hemorrhoids  melatonin 3 milliGRAM(s) Oral at bedtime PRN Insomnia  ondansetron Injectable 4 milliGRAM(s) IV Push every 8 hours PRN Nausea and/or Vomiting      Vital Signs Last 24 Hrs  T(C): 36.7 (07 Jun 2022 00:17), Max: 36.9 (06 Jun 2022 21:20)  T(F): 98 (07 Jun 2022 00:17), Max: 98.5 (06 Jun 2022 21:20)  HR: 72 (07 Jun 2022 06:30) (66 - 74)  BP: 121/55 (07 Jun 2022 06:30) (121/55 - 166/70)  BP(mean): --  RR: 18 (07 Jun 2022 00:17) (18 - 18)  SpO2: 94% (07 Jun 2022 00:17) (93% - 98%)  CAPILLARY BLOOD GLUCOSE        I&O's Summary    06 Jun 2022 07:01  -  07 Jun 2022 07:00  --------------------------------------------------------  IN: 220 mL / OUT: 0 mL / NET: 220 mL        PHYSICAL EXAM:  GENERAL: NAD, well-developed  HEAD:  Atraumatic, Normocephalic  EYES: EOMI, PERRLA, conjunctiva and sclera clear  NECK: Supple, No JVD  CHEST/LUNG: Clear to auscultation bilaterally; No wheeze  HEART: Regular rate and rhythm; No murmurs, rubs, or gallops  ABDOMEN: Soft, Nontender, Nondistended; Bowel sounds present  EXTREMITIES:  2+ Peripheral Pulses, No clubbing, cyanosis, or edema  PSYCH: AAOx3  NEUROLOGY: non-focal  SKIN: No rashes or lesions    LABS:                        9.4    7.63  )-----------( 341      ( 07 Jun 2022 07:03 )             28.5     06-07    137  |  96  |  13  ----------------------------<  118<H>  3.8   |  26  |  0.95    Ca    10.1      07 Jun 2022 06:53  Phos  2.7     06-07  Mg     1.6     06-07    TPro  6.1  /  Alb  3.3  /  TBili  0.4  /  DBili  x   /  AST  16  /  ALT  18  /  AlkPhos  97  06-07    PT/INR - ( 05 Jun 2022 07:50 )   PT: 23.4 sec;   INR: 2.00 ratio         PTT - ( 05 Jun 2022 07:50 )  PTT:35.6 sec          RADIOLOGY & ADDITIONAL TESTS:    Imaging Personally Reviewed:    Consultant(s) Notes Reviewed:      Care Discussed with Consultants/Other Providers:   Anila Ruff MD  Internal Medicine PGY1  [facility]: [id number]/ NS: [phone]    DATE OF SERVICE: 06-07-22 @ 07:47    Patient is a 71y old  Male who presents with a chief complaint of Abdominal pain (06 Jun 2022 12:49)      SUBJECTIVE / OVERNIGHT EVENTS:      MEDICATIONS  (STANDING):  allopurinol 200 milliGRAM(s) Oral daily  aluminum hydroxide/magnesium hydroxide/simethicone Suspension 30 milliLiter(s) Oral every 4 hours  artificial tears (preservative free) Ophthalmic Solution 1 Drop(s) Both EYES two times a day  bisoprolol   Tablet 10 milliGRAM(s) Oral daily  famotidine Injectable 20 milliGRAM(s) IV Push every 12 hours  lactated ringers. 1000 milliLiter(s) (100 mL/Hr) IV Continuous <Continuous>  rosuvastatin 10 milliGRAM(s) Oral at bedtime  sucralfate suspension 1 Gram(s) Oral two times a day  tamsulosin 0.8 milliGRAM(s) Oral at bedtime    MEDICATIONS  (PRN):  acetaminophen     Tablet .. 650 milliGRAM(s) Oral every 6 hours PRN Temp greater or equal to 38C (100.4F), Mild Pain (1 - 3)  hydrocortisone 2.5% Rectal Cream 1 Application(s) Rectal three times a day PRN hemorrhoids  melatonin 3 milliGRAM(s) Oral at bedtime PRN Insomnia  ondansetron Injectable 4 milliGRAM(s) IV Push every 8 hours PRN Nausea and/or Vomiting      Vital Signs Last 24 Hrs  T(C): 36.7 (07 Jun 2022 00:17), Max: 36.9 (06 Jun 2022 21:20)  T(F): 98 (07 Jun 2022 00:17), Max: 98.5 (06 Jun 2022 21:20)  HR: 72 (07 Jun 2022 06:30) (66 - 74)  BP: 121/55 (07 Jun 2022 06:30) (121/55 - 166/70)  BP(mean): --  RR: 18 (07 Jun 2022 00:17) (18 - 18)  SpO2: 94% (07 Jun 2022 00:17) (93% - 98%)  CAPILLARY BLOOD GLUCOSE        I&O's Summary    06 Jun 2022 07:01  -  07 Jun 2022 07:00  --------------------------------------------------------  IN: 220 mL / OUT: 0 mL / NET: 220 mL        PHYSICAL EXAM:  GENERAL: NAD, well-developed  HEAD:  Atraumatic, Normocephalic  EYES: EOMI, PERRLA, conjunctiva and sclera clear  NECK: Supple, No JVD  CHEST/LUNG: Clear to auscultation bilaterally; No wheeze  HEART: Regular rate and rhythm; No murmurs, rubs, or gallops  ABDOMEN: Soft, Nontender, Nondistended; Bowel sounds present  EXTREMITIES:  2+ Peripheral Pulses, No clubbing, cyanosis, or edema  PSYCH: AAOx3  NEUROLOGY: non-focal  SKIN: No rashes or lesions    LABS:                        9.4    7.63  )-----------( 341      ( 07 Jun 2022 07:03 )             28.5     06-07    137  |  96  |  13  ----------------------------<  118<H>  3.8   |  26  |  0.95    Ca    10.1      07 Jun 2022 06:53  Phos  2.7     06-07  Mg     1.6     06-07    TPro  6.1  /  Alb  3.3  /  TBili  0.4  /  DBili  x   /  AST  16  /  ALT  18  /  AlkPhos  97  06-07    PT/INR - ( 05 Jun 2022 07:50 )   PT: 23.4 sec;   INR: 2.00 ratio         PTT - ( 05 Jun 2022 07:50 )  PTT:35.6 sec          RADIOLOGY & ADDITIONAL TESTS:    Imaging Personally Reviewed:    Consultant(s) Notes Reviewed:      Care Discussed with Consultants/Other Providers:   Anila Ruff MD  Internal Medicine PGY1  [facility]: [id number]/ NS: [phone]    DATE OF SERVICE: 06-07-22 @ 07:47    Patient is a 71y old  Male who presents with a chief complaint of Abdominal pain (06 Jun 2022 12:49)      SUBJECTIVE / OVERNIGHT EVENTS:  No acute events overnight.   Patient had one BM this morning, normal, brown, patient describes.   +Reports mild, subtle discomfort in LUQ. No new worsened abdominal pain, n/v/d overnight.   Afebrile, hemodynamically stable.       MEDICATIONS  (STANDING):  allopurinol 200 milliGRAM(s) Oral daily  aluminum hydroxide/magnesium hydroxide/simethicone Suspension 30 milliLiter(s) Oral every 4 hours  artificial tears (preservative free) Ophthalmic Solution 1 Drop(s) Both EYES two times a day  bisoprolol   Tablet 10 milliGRAM(s) Oral daily  famotidine Injectable 20 milliGRAM(s) IV Push every 12 hours  lactated ringers. 1000 milliLiter(s) (100 mL/Hr) IV Continuous <Continuous>  rosuvastatin 10 milliGRAM(s) Oral at bedtime  sucralfate suspension 1 Gram(s) Oral two times a day  tamsulosin 0.8 milliGRAM(s) Oral at bedtime    MEDICATIONS  (PRN):  acetaminophen     Tablet .. 650 milliGRAM(s) Oral every 6 hours PRN Temp greater or equal to 38C (100.4F), Mild Pain (1 - 3)  hydrocortisone 2.5% Rectal Cream 1 Application(s) Rectal three times a day PRN hemorrhoids  melatonin 3 milliGRAM(s) Oral at bedtime PRN Insomnia  ondansetron Injectable 4 milliGRAM(s) IV Push every 8 hours PRN Nausea and/or Vomiting      Vital Signs Last 24 Hrs  T(C): 36.7 (07 Jun 2022 00:17), Max: 36.9 (06 Jun 2022 21:20)  T(F): 98 (07 Jun 2022 00:17), Max: 98.5 (06 Jun 2022 21:20)  HR: 72 (07 Jun 2022 06:30) (66 - 74)  BP: 121/55 (07 Jun 2022 06:30) (121/55 - 166/70)  BP(mean): --  RR: 18 (07 Jun 2022 00:17) (18 - 18)  SpO2: 94% (07 Jun 2022 00:17) (93% - 98%)  CAPILLARY BLOOD GLUCOSE        I&O's Summary    06 Jun 2022 07:01  -  07 Jun 2022 07:00  --------------------------------------------------------  IN: 220 mL / OUT: 0 mL / NET: 220 mL        PHYSICAL EXAM:  GENERAL: NAD, well-developed  EYES: EOMI, PERRLA, conjunctiva and sclera clear, anicteric  NECK: Supple, loose neck folds  CHEST/LUNG: No inc work of breathinig. No wheezes, rhonci, audible  HEART: Regular rate and irregular rhythm; No murmurs, rubs, or gallops  ABDOMEN: mildly tender LUQ, Soft, Nontender, Nondistended; Bowel sounds present  EXTREMITIES:  2+ Peripheral Pulses, No clubbing, cyanosis, or edema  PSYCH: AAOx3  NEUROLOGY: AA03, no focal deficits  SKIN: No rashes or lesions. No yellowing or escoriations    LABS:                        9.4    7.63  )-----------( 341      ( 07 Jun 2022 07:03 )             28.5     06-07    137  |  96  |  13  ----------------------------<  118<H>  3.8   |  26  |  0.95    Ca    10.1      07 Jun 2022 06:53  Phos  2.7     06-07  Mg     1.6     06-07    TPro  6.1  /  Alb  3.3  /  TBili  0.4  /  DBili  x   /  AST  16  /  ALT  18  /  AlkPhos  97  06-07    PT/INR - ( 05 Jun 2022 07:50 )   PT: 23.4 sec;   INR: 2.00 ratio         PTT - ( 05 Jun 2022 07:50 )  PTT:35.6 sec          RADIOLOGY & ADDITIONAL TESTS:  ACC: [id number] EXAM: MR MRCP WAW IC    PROCEDURE DATE: 06/06/2022        INTERPRETATION: CLINICAL INFORMATION: Pancreatitis with fluid collections    COMPARISON: CT abdomen 6/4/2022    CONTRAST/COMPLICATIONS:  IV Contrast: Gadavist 9 cc administered 1 cc discarded  Oral Contrast: NONE  Complications: None reported at time of study completion    PROCEDURE:  MRI of the abdomen was performed.  MRCP was performed.    FINDINGS:  LOWER CHEST: Trace pleural effusions.    LIVER: Couple tiny scattered subcentimeter cysts  BILE DUCTS: Normal caliber.  GALLBLADDER: Cholecystectomy.  SPLEEN: Top normal. Trace perisplenic fluid. Peripancreatic inflammation extends to the splenic hilus  PANCREAS: Multiple complex macrocystic lesions with internal nodular enhancement suspicious for cystic neoplasms as follows:  Pancreatic head 5 cm lesion which suggests ductal communication with a solid internal enhancing at least 1.5 cm nodule (7, 1 and 5, 32)  Pancreatic tail approximately 8 x 7 x 3 cm lesion with irregular internal septal and nodular enhancement (13, 36 and 1201, 40). Surrounding peripancreatic inflammation  Adjacent distal pancreatic body 5 x 4 x 3 cm lesion with irregular septal and nodular enhancement (13, 39 and 1201, 47). Surrounding peripancreatic inflammation.  No main duct dilation. Couple additional smaller cystic pancreatic lesions for example measuring 2 cm of the neck.  Decreased pancreatic T1 bright signal and decreased enhancement involving the distal body and tail  ADRENALS: Within normal limits.  KIDNEYS/URETERS: Symmetric renal enhancement without hydronephrosis. Small areas of cortical scarring bilaterally Small bilateral simple cysts. Small hemorrhagic cysts within the right mid kidney and right lower pole.    VISUALIZED PORTIONS:  BOWEL: Gastric wall thickening with extension of peripancreatic inflammation to the greater curvature.  Colonic splenic flexure wall thickening likely secondary to extension of the pancreatic inflammation  Unobstructed bowel  PERITONEUM: Trace fluid along the left paracolic gutter  VESSELS: Atherosclerotic changes.  RETROPERITONEUM/LYMPH NODES: No lymphadenopathy.  ABDOMINAL WALL: Small fat-containing umbilical hernia.  BONES: Multilevel degenerative changes throughout the spine.    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 Distal pancreatic body and tail changes which may represent associated pancreatitis however infiltrative pattern pancreatic tumor can also have this appearance. Recommend correlation with tumor markers and endoscopic ultrasound with sampling

## 2022-06-07 NOTE — PROGRESS NOTE ADULT - PROBLEM SELECTOR PLAN 1
Acute pancreatitis likely 2/2 pancreatic head lesions, unknown etiology, found on CT at pancreatic body and tail, pancreatic ductal dilatation. No pleural effusions  MRI: Pancreatic head as well as distal body and tail large macrocystic lesions suspicious for mucinous cyst neoplasms  Lipase 212  Ca 9.7  TG wnl  - f/u [facility], Dr. Cruz outpatient  - lowfat diet

## 2022-06-07 NOTE — PROGRESS NOTE ADULT - PROBLEM SELECTOR PLAN 2
CT: Several pancreatic and peripancreatic fluid collections, 4.5 x 3.2 x 3.8 cm; collection in the pancreatic body measures 2.0 x 1.6 x 1.4 cm; cystic lesions in the pancreatic head, the larger of which measures 2 cm.  Abscess versus walled off necrosis    - c/w IV Meropenem (6/4- )  - GI consult  - Surgery consult- no surgical interventions. Consider IR if clinical decompensation  - consider MRI abdomen versus EUS pending GI reccs
CT: Several pancreatic and peripancreatic fluid collections, 4.5 x 3.2 x 3.8 cm; collection in the pancreatic body measures 2.0 x 1.6 x 1.4 cm; cystic lesions in the pancreatic head, the larger of which measures 2 cm.  Abscess versus walled off necrosis    - c/w IV Meropenem (6/4- )  - GI consult  - Surgery consult- no surgical interventions. Consider IR if clinical decompensation  - consider MRI abdomen versus EUS pending GI reccs
CT: Several pancreatic and peripancreatic fluid collections, 4.5 x 3.2 x 3.8 cm; collection in the pancreatic body measures 2.0 x 1.6 x 1.4 cm; cystic lesions in the pancreatic head, the larger of which measures 2 cm.  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 Consider IR if clinical decompensation

## 2022-06-07 NOTE — PROGRESS NOTE ADULT - PROBLEM SELECTOR PLAN 6
ECG, sinus rythym  Home regimen: Eliquis 5mg daily  [id number]: 2pts; 2.2% stroke risk   Dr. Jana Llamas is outpatient Cardiologist  - per pt will f/u outpatient for scheduled ECG, Echo, stress test  - restart eliquis 5mg daily upon d/c today

## 2022-06-07 NOTE — PROGRESS NOTE ADULT - ATTENDING COMMENTS
Acute Pancreatitis  ddx includes 2/2 pancreatic stricture given pancreatic duct dilation vs mediation induced as Omeprazole, HCTZ and Crestor all have been associated with pancreatitis vs idiopathic. Pain continues to improve  Plan for MRCP today to evaluate collections  c/w IVF for now  Advance diet as tolerated  Leukocytosis and fever at home could be 2/2 pancreatitis itself vs infected collections. On Meropenem for now. Surgery consulted, no role for drainage at this moment. Will consider d/c abx if MRCP read showing collections not infected   GI consulted given pancreatic duct findings, f/u recommendations  Send IGG levels and Ca 19-9    Hyponatremia  Suspect hypovolemic hyponatremia Currently improving on IVF, correcting appropriately. Na 133 today  IVF as above  Can check BMP daily now   Continue holding Hctz
As above  Acute pancreatitis with complex fluid collections vs multiple side branch IPMNs, no prior abdominal imaging for comparison  Needs close outpatient followup with repeat imaging and EUS when pancreatic inflammation resolves (approx 4 weeks) - [phone]  Will subsequently present at multidisciplinary pancreas conference  OK for discharge from GI standpoint however as no further inpatient needs as pancreatitis is symptomatically    Thank you for this interesting consult.  Please call the [facility] service with any questions or concerns.
Improved clinically, tolerating diet without increase in pain  MRCP findings discussed with patient. Pt has pancreatic head as well as distal body and tail large macrocystic lesions suspicious for mucinous cyst neoplasms. Discussed with GI yesterday, no role for EUS at present. To wait until pancreatitis and inflammation resolve and check EUS in 4-6 weeks. Pt aware and will make appointment  Given MRCP findings, lower suspicion for infection, discontinued Meropenem  Hyponatremia resolved today    Given improvement, plan for d/c home today  d/c time 35 minutes
Acute Pancreatitis  ddx includes 2/2 pancreatic stricture given pancreatic duct dilation vs mediation induced as Omeprazole, HCTZ and Crestor all have been associated with pancreatitis vs idiopathic. Pain better today  c/w IVF  Start diet today  Leukocytosis and fever at home could be 2/2 pancreatitis itself vs infected collections. On Meropenem for now. Surgery consulted, no role for drainage at this moment  MRI pending to evaluate pancreatic duct and architecture   GI consult given pancreatic duct findings    Hyponatremia  Suspect hypovolemic hyponatremia Currently improving on IVF, correcting appropriately  IVF as above  Trend BMP   Continue holding Hctz

## 2022-06-07 NOTE — PROGRESS NOTE ADULT - ASSESSMENT
71M significant smoking Hx x 40 years (quit 20 years ago), GERD, Sierra's Esophagus (Last EGD in 4/2022 w/ no acute changes), Gout, Afib (on Eliquis), HTN presenting with abd pain, fevers, found to have acute pancreatitis with cystic pancreatic lesions and pancreatic fluid collections on MR abdomen.       Impression:   # Pancreatic cystic lesions - Localized to tail and distal body; suspect IPMN give multiple lesions however differential also includes complex fluid collections in setting of acute pancreatitis vs. less likely mucinous cystic neoplasms.  # Acute pancreatitis w/ development of pancreatic collections -  Clinically improving and tolerating regular diet with minimal pain, afebrile; etiology of pancreatitis unclear to date; s/p cholecystectomy w/ normal bile ducts, normal triglycerides, insignificant ETOH use. Cystic lesions within pancreas that involve main pancreatic duct or cause duct compression may cause pancreatitis.  # Sierra's esophagus without acute change in 04/2022    Recommendations:  - EUS with FNA of cystic pancreas lesions as an outpatient, when acute pancreatitis has resolved; [facility] team ([phone]) will call patient to scheduled outpatient procedure   - advance low-fat diet as tolerated  - [facility] signing off.      Azucena King PGY-6  Gastroenterology/Hepatology Fellow  Pager #[phone]  E-mail [email or web address]  Call [phone] to speak to answering service for on-call GI fellow 5pm-7am and weekends.    
70 y/o M w/ pmh of GERD, Marcial's Esophagus (Last EGD in 4/2022 w/ no acute changes), Gout, Afibb (on Eliquis), HTN who presents w/ abd pain. Sharp, up to 8/10 pains initially diffuse, now localized upper Lt quadrant/ mid- epigastrum worsened after eating for 4 days likely 2/2 Acute pancreatitis, unknown etiology, found on CT with multiple peripancreatic fluid collections. 
72 y/o M w/ pmh of GERD, Marcial's Esophagus (Last EGD in 4/2022 w/ no acute changes), Gout, Afibb (on Eliquis), HTN who presents w/ abd pain 2/2 Acute pancreatitis, found on CT with multiple peripancreatic fluid collections, and MRI abdomen with pancreatic head mass and various peripancreatic lesions concerning for mucinous neoplasm. PT clinically stable for discharge with outpatient EUS and biopsy with [facility]. 
70 y/o M w/ pmh of GERD, Amrcial's Esophagus (Last EGD in 4/2022 w/ no acute changes), Gout, Afibb (on Eliquis), HTN who presents w/ abd pain. Sharp, up to 8/10 pains initially diffuse, now localized upper Lt quadrant/ mid- epigastrum worsened after eating for 4 days likely 2/2 Acute pancreatitis, unknown etiology, found on CT with multiple peripancreatic fluid collections.

## 2022-06-07 NOTE — DISCHARGE NOTE PROVIDER - NSDCFUSCHEDAPPT_GEN_ALL_CORE_FT
John Gabriel  [facility]  [address]  Scheduled Appointment: 06/28/2022

## 2022-06-07 NOTE — DISCHARGE NOTE PROVIDER - NSDCCPCAREPLAN_GEN_ALL_CORE_FT
PRINCIPAL DISCHARGE DIAGNOSIS  Diagnosis: Pancreatitis  Assessment and Plan of Treatment: You came in with abdominal pain, diarrhea and were found with acute pancreatitis or inflammation of the pancreatitis. Your hospital course was complicated by findings of Pancreatic lesions. The lesions of the head of the pancreas may have obstructed ducts that drain the pancreas, leading to the inflammation and symptoms. We treated yor symptoms with fluids, and slowly re- introduced a lowfat diet. It is important that you continue to have moderate low- fat meals and drink fluids.   - Please see the Advanced [facility] Physician for follow- up appointment for further imaging and management.   - Please return to the emergency department if you experience any severe new- onset abdominal pain, chest pain, nausea, vomiting black or yellow fluids, fevers, chills.         SECONDARY DISCHARGE DIAGNOSES  Diagnosis: Hyponatremia  Assessment and Plan of Treatment: You came in with low Sodium levels, most likely due to volume depletion and stress from your presentation of pancreatitis and pancreatic lesions. We treated you with fluids and your body self- corrected sodium levels to normal. We held your Bisoprolol- Hydrochlorothiazide combination medication, as Hydrochlorothiazed can contribute to sodium losses.  - Please follow- up routine lab work with your primary care provider within the following two weeks    Diagnosis: Hypertension  Assessment and Plan of Treatment: - Please take Bisoprolol 10mg daily until you follow- up with your primary care provider within the next two weeks.     PRINCIPAL DISCHARGE DIAGNOSIS  Diagnosis: Pancreatitis  Assessment and Plan of Treatment: You came in with abdominal pain, diarrhea and were found with acute pancreatitis or inflammation of the pancreas. Pancreatitis has many causes, the most common of which are gallstones, alcohol use, high triglycerides.  Because your abdominal pain resembled gallstone, which was still possible even though you had your gallbladder removed, an MRI of the liver was obtained.  The MRI did not show stones but did show cysts that could have obstructed the pancreatic duct and caused your pancreatitis.  Your pancreatitis symptoms improved with IV fluids.  You tolerated food by mouth and were suitable for discharge to follow up with the gastroenterologists for acute pancreatitis and what was found on the MRI. It is important that you continue to have moderate low- fat meals and drink fluids.   - Please see the Advanced GI Physician for follow- up appointment for further imaging and management.   - Please return to the emergency department if you experience any severe new- onset abdominal pain, chest pain, nausea, vomiting black or yellow fluids, fevers, chills.         SECONDARY DISCHARGE DIAGNOSES  Diagnosis: Hypertension  Assessment and Plan of Treatment: - Please take Bisoprolol 10mg daily until you follow- up with your primary care provider within the next two weeks.    Diagnosis: Hyponatremia  Assessment and Plan of Treatment: You came in with low Sodium levels, most likely due to volume depletion and stress from your presentation of pancreatitis and pancreatic lesions. We treated you with fluids and your body self- corrected sodium levels to normal. We held your Bisoprolol- Hydrochlorothiazide combination medication, as Hydrochlorothiazed can contribute to sodium losses.  - Please follow- up routine lab work with your primary care provider within the following two weeks    Diagnosis: Mucinous cystadenocarcinoma of pancreas  Assessment and Plan of Treatment: An MRCP, a type of MRI used to see if there is anything obstructing your biliary ducts, showed cystic masses in your pancreas.  These were suspicious for cancer but because you also had a lot of inflammation it was not possible to make a definitive diagnosis.  Once the inflammation has calmed down, there will be plans to take a tissue sample by ultrasound.  This will be down outpatient.  Please follow up with the gastroenterologist regarding this finding on your MRI and to plan for additional diagnostics and treatment.  If you experience green vomiting, fever, chills, intense abdominal pain, please go to the nearest emergency room.  Please follow up with your primary care physician within 2 weeks of discharge from the hospital, and discuss with her your hospital diagnosis and any medication changes that was made.     PRINCIPAL DISCHARGE DIAGNOSIS  Diagnosis: Pancreatitis  Assessment and Plan of Treatment: You came in with abdominal pain, diarrhea and were found with acute pancreatitis or inflammation of the pancreas. Pancreatitis has many causes, the most common of which are gallstones, alcohol use, high triglycerides.  Because your abdominal pain resembled gallstone, which was still possible even though you had your gallbladder removed, an MRI of the liver was obtained.  The MRI did not show stones but did show cysts that could have obstructed the pancreatic duct and caused your pancreatitis.  Your pancreatitis symptoms improved with IV fluids.  You tolerated food by mouth and were suitable for discharge to follow up with the gastroenterologists for acute pancreatitis and what was found on the MRI. It is important that you continue to have moderate low- fat meals and drink fluids.   - Please see the Advanced GI Physician for follow- up appointment for further imaging and management.   - Please return to the emergency department if you experience any severe new- onset abdominal pain, chest pain, nausea, vomiting black or yellow fluids, fevers, chills.         SECONDARY DISCHARGE DIAGNOSES  Diagnosis: Hyponatremia  Assessment and Plan of Treatment: You came in with low Sodium levels, most likely due to volume depletion and stress from your presentation of pancreatitis and pancreatic lesions. We treated you with fluids and your body self- corrected sodium levels to normal. We held your Bisoprolol- Hydrochlorothiazide combination medication, as Hydrochlorothiazed can contribute to sodium losses.  - Please follow- up routine lab work with your primary care provider within the following two weeks    Diagnosis: Hypertension  Assessment and Plan of Treatment: - Please take Bisoprolol 10mg daily until you follow- up with your primary care provider within the next two weeks.    Diagnosis: Mucinous cystadenocarcinoma of pancreas  Assessment and Plan of Treatment: An MRCP, a type of MRI used to see if there is anything obstructing your biliary ducts, showed cystic masses in your pancreas.  These were suspicious for cancer but because you also had a lot of inflammation it was not possible to make a definitive diagnosis.  Once the inflammation has calmed down, there will be plans to take a tissue sample by ultrasound.  This will be done outpatient.  Please follow up with the gastroenterologist regarding this finding on your MRI and to plan for additional diagnostics and treatment.  If you experience green vomiting, fever, chills, intense abdominal pain, please go to the nearest emergency room.  Please follow up with your primary care physician within 2 weeks of discharge from the hospital, and discuss with her your hospital diagnosis and any medication changes that was made.

## 2022-06-07 NOTE — PROGRESS NOTE ADULT - SUBJECTIVE AND OBJECTIVE BOX
Chief Complaint:  Patient is a 71y old  Male who presents with a chief complaint of Abdominal pain (07 Jun 2022 07:47)      Interval Events:     MR abdomen resulted    Patient feels well today, ate solid food for breakfast without increased abdominal pain  No oral of IV medication requirement for pain    Allergies:  Norvasc (Flushing)  simvastatin (Faint; Flushing)  Vasotec (Faint; Flushing)      Hospital Medications:  acetaminophen     Tablet .. 650 milliGRAM(s) Oral every 6 hours PRN  allopurinol 200 milliGRAM(s) Oral daily  aluminum hydroxide/magnesium hydroxide/simethicone Suspension 30 milliLiter(s) Oral every 4 hours  artificial tears (preservative free) Ophthalmic Solution 1 Drop(s) Both EYES two times a day  bisoprolol   Tablet 10 milliGRAM(s) Oral daily  famotidine Injectable 20 milliGRAM(s) IV Push every 12 hours  hydrocortisone 2.5% Rectal Cream 1 Application(s) Rectal three times a day PRN  lactated ringers. 1000 milliLiter(s) IV Continuous <Continuous>  melatonin 3 milliGRAM(s) Oral at bedtime PRN  ondansetron Injectable 4 milliGRAM(s) IV Push every 8 hours PRN  rosuvastatin 10 milliGRAM(s) Oral at bedtime  sucralfate suspension 1 Gram(s) Oral two times a day  tamsulosin 0.8 milliGRAM(s) Oral at bedtime      PMHX/PSHX:  Vertigo    Hypertension    Hyperlipemia    Sierra esophagus    S/P herniorrhaphy    S/P tonsillectomy    Gallbladder calculus    History of cholecystectomy        Family history:      ROS:     General:  No weight loss, fevers, chills, night sweats, fatigue   Eyes:  No vision changes  ENT:  No sore throat, pain, runny nose  CV:  No chest pain, palpitations, dizziness   Resp:  No SOB, cough, wheezing  GI:  See HPI  :  No burning with urination, hematuria  Muscle:  No pain, weakness  Neuro:  No weakness/tingling, memory problems  Psych:  No fatigue, insomnia, mood problems, depression  Heme:  No easy bruisability  Skin:  No rash, edema      PHYSICAL EXAM:     GENERAL:  Well developed, no distress  EYES: Conjunctivae clear +sclera icteric  HEENT:  Trachea midline, no JVD  CHEST:  No increased effort w/ respirations  HEART:  Regular rhythm & rate  ABDOMEN:  Soft, non-tender, non-distended  EXTREMITIES:  no LE  edema  SKIN:  No rash/erythema/ecchymoses/petechiae/wounds/jaundice  NEURO:  Alert, orientedx3    Vital Signs:  Vital Signs Last 24 Hrs  T(C): 36.6 (07 Jun 2022 08:31), Max: 36.9 (06 Jun 2022 21:20)  T(F): 97.9 (07 Jun 2022 08:31), Max: 98.5 (06 Jun 2022 21:20)  HR: 67 (07 Jun 2022 08:31) (67 - 74)  BP: 153/75 (07 Jun 2022 08:31) (121/55 - 166/70)  BP(mean): --  RR: 18 (07 Jun 2022 08:31) (18 - 18)  SpO2: 98% (07 Jun 2022 08:31) (93% - 98%)  Daily     Daily     LABS:                        9.4    7.63  )-----------( 341      ( 07 Jun 2022 07:03 )             28.5     06-07    137  |  96  |  13  ----------------------------<  118<H>  3.8   |  26  |  0.95    Ca    10.1      07 Jun 2022 06:53  Phos  2.7     06-07  Mg     1.6     06-07    TPro  6.1  /  Alb  3.3  /  TBili  0.4  /  DBili  x   /  AST  16  /  ALT  18  /  AlkPhos  97  06-07    LIVER FUNCTIONS - ( 07 Jun 2022 06:53 )  Alb: 3.3 g/dL / Pro: 6.1 g/dL / ALK PHOS: 97 U/L / ALT: 18 U/L / AST: 16 U/L / GGT: x             Imaging:    < from: MR MRCP w/wo IV Cont (06.06.22 @ 13:01) >  ACC: [id number] EXAM:  MR MRCP WAW IC                          PROCEDURE DATE:  06/06/2022          INTERPRETATION:  CLINICAL INFORMATION: Pancreatitis with fluid collections    COMPARISON: CT abdomen 6/4/2022    CONTRAST/COMPLICATIONS:  IV Contrast:Gadavist  9 cc administered   1 cc discarded  Oral Contrast: NONE  Complications: None reported at time of study completion    PROCEDURE:  MRI of the abdomen was performed.  MRCP was performed.    FINDINGS:  LOWER CHEST: Trace pleural effusions.    LIVER: Couple tiny scattered subcentimeter cysts  BILE DUCTS: Normal caliber.  GALLBLADDER: Cholecystectomy.  SPLEEN: Top normal. Trace perisplenic fluid. Peripancreatic inflammation   extends to the splenic hilus  PANCREAS: Multiple complex macrocystic lesions with internal nodular   enhancement suspicious for cystic neoplasms as follows:  Pancreatic head 5 cm lesion which suggests ductal communication with a   solid internal enhancing at least 1.5 cm nodule (7, 1 and 5, 32)  Pancreatic tail approximately 8 x 7 x 3 cm lesion with irregular internal   septal and nodular enhancement (13, 36 and 1201, 40). Surrounding   peripancreatic inflammation  Adjacent distal pancreatic body 5 x 4 x 3 cm lesion with irregular septal   and nodular enhancement(13, 39 and 1201, 47). Surrounding peripancreatic   inflammation.  No main duct dilation. Couple additional smaller cystic pancreatic   lesions for example measuring 2 cm of the neck.  Decreased pancreatic T1 bright signal and decreased enhancement involving   the distal body and tail  ADRENALS: Within normal limits.  KIDNEYS/URETERS: Symmetric renal enhancement without hydronephrosis.   Small areas of cortical scarring bilaterally Small bilateral simple   cysts. Small hemorrhagic cysts within the right mid kidney and right   lower pole.    VISUALIZED PORTIONS:  BOWEL: Gastric wall thickening with extension of peripancreatic   inflammation to the greater curvature.  Colonic splenic flexure wall thickening likely secondary to extension of   the pancreatic inflammation  Unobstructed bowel  PERITONEUM: Trace fluid along the left paracolic gutter  VESSELS: Atherosclerotic changes.  RETROPERITONEUM/LYMPH NODES: No lymphadenopathy.  ABDOMINAL WALL: Small fat-containing umbilical hernia.  BONES:Multilevel degenerative changes throughout the spine.    IMPRESSION:    Pancreatic head as well as distal body and tail large macrocystic lesions   suspicious for mucinous cyst neoplasms. Distal pancreatic body and tail   changes which may represent associated pancreatitis however infiltrative   pattern pancreatic tumor can also have this appearance. Recommend   correlation with tumor markers and endoscopic ultrasound with sampling      --- End of Report ---           RANDI GOLD MD; Attending Radiologist  This document has been electronically signed. Jun 6 2022  2:06PM

## 2022-06-09 LAB
CULTURE RESULTS: SIGNIFICANT CHANGE UP
SPECIMEN SOURCE: SIGNIFICANT CHANGE UP

## 2022-06-10 LAB
IGG SERPL-MCNC: 494 MG/DL — LOW (ref 603–1613)
IGG1 SER-MCNC: 273 MG/DL — SIGNIFICANT CHANGE UP (ref 248–810)
IGG2 SER-MCNC: 131 MG/DL — SIGNIFICANT CHANGE UP (ref 130–555)
IGG3 SER-MCNC: 36 MG/DL — SIGNIFICANT CHANGE UP (ref 15–102)
IGG4 SER-MCNC: 16 MG/DL — SIGNIFICANT CHANGE UP (ref 2–96)

## 2022-06-28 ENCOUNTER — APPOINTMENT (OUTPATIENT)
Dept: COLORECTAL SURGERY | Facility: CLINIC | Age: 72
End: 2022-06-28

## 2022-12-20 ENCOUNTER — RX RENEWAL (OUTPATIENT)
Age: 72
End: 2022-12-20

## 2022-12-20 RX ORDER — HYDROCORTISONE 25 MG/G
2.5 CREAM TOPICAL
Qty: 30 | Refills: 0 | Status: ACTIVE | COMMUNITY
Start: 2022-05-17 | End: 1900-01-01

## 2024-11-04 ENCOUNTER — APPOINTMENT (OUTPATIENT)
Dept: PODIATRY | Facility: CLINIC | Age: 74
End: 2024-11-04
Payer: MEDICARE

## 2024-11-04 DIAGNOSIS — B35.1 TINEA UNGUIUM: ICD-10-CM

## 2024-11-04 DIAGNOSIS — L85.3 XEROSIS CUTIS: ICD-10-CM

## 2024-11-04 DIAGNOSIS — M79.675 PAIN IN RIGHT TOE(S): ICD-10-CM

## 2024-11-04 DIAGNOSIS — B35.3 TINEA PEDIS: ICD-10-CM

## 2024-11-04 DIAGNOSIS — M79.674 PAIN IN RIGHT TOE(S): ICD-10-CM

## 2024-11-04 PROCEDURE — 11721 DEBRIDE NAIL 6 OR MORE: CPT

## 2024-11-04 PROCEDURE — 99202 OFFICE O/P NEW SF 15 MIN: CPT | Mod: 25

## 2024-11-04 RX ORDER — HYDROCORTISONE 1 %
12 CREAM (GRAM) TOPICAL TWICE DAILY
Qty: 1 | Refills: 2 | Status: ACTIVE | COMMUNITY
Start: 2024-11-04 | End: 1900-01-01

## 2024-11-06 PROBLEM — L85.3 XEROSIS OF SKIN: Status: ACTIVE | Noted: 2024-11-05

## 2024-11-06 PROBLEM — M79.674 PAIN IN TOES OF BOTH FEET: Status: ACTIVE | Noted: 2024-11-05

## 2024-11-06 PROBLEM — B35.1 ONYCHOMYCOSIS: Status: ACTIVE | Noted: 2024-11-05
